# Patient Record
Sex: FEMALE | Race: WHITE | Employment: OTHER | URBAN - METROPOLITAN AREA
[De-identification: names, ages, dates, MRNs, and addresses within clinical notes are randomized per-mention and may not be internally consistent; named-entity substitution may affect disease eponyms.]

---

## 2017-05-15 RX ORDER — HYDROCHLOROTHIAZIDE 25 MG/1
TABLET ORAL
Qty: 90 TABLET | Refills: 0 | Status: SHIPPED | OUTPATIENT
Start: 2017-05-15 | End: 2017-08-19 | Stop reason: SDUPTHER

## 2017-05-31 RX ORDER — ATENOLOL 25 MG/1
TABLET ORAL
Qty: 60 TABLET | Refills: 0 | Status: SHIPPED | OUTPATIENT
Start: 2017-05-31 | End: 2017-07-02 | Stop reason: SDUPTHER

## 2017-07-03 RX ORDER — ATENOLOL 25 MG/1
TABLET ORAL
Qty: 60 TABLET | Refills: 0 | Status: SHIPPED | OUTPATIENT
Start: 2017-07-03 | End: 2017-07-31 | Stop reason: SDUPTHER

## 2017-08-21 RX ORDER — HYDROCHLOROTHIAZIDE 25 MG/1
TABLET ORAL
Qty: 90 TABLET | Refills: 0 | Status: SHIPPED | OUTPATIENT
Start: 2017-08-21 | End: 2017-11-23 | Stop reason: SDUPTHER

## 2017-11-24 RX ORDER — HYDROCHLOROTHIAZIDE 25 MG/1
TABLET ORAL
Qty: 90 TABLET | Refills: 1 | Status: SHIPPED | OUTPATIENT
Start: 2017-11-24 | End: 2018-05-25 | Stop reason: SDUPTHER

## 2018-02-05 ENCOUNTER — OFFICE VISIT (OUTPATIENT)
Dept: FAMILY MEDICINE CLINIC | Age: 68
End: 2018-02-05
Payer: MEDICARE

## 2018-02-05 VITALS
WEIGHT: 155 LBS | SYSTOLIC BLOOD PRESSURE: 144 MMHG | BODY MASS INDEX: 29.29 KG/M2 | HEART RATE: 64 BPM | DIASTOLIC BLOOD PRESSURE: 84 MMHG

## 2018-02-05 DIAGNOSIS — E78.2 MIXED HYPERLIPIDEMIA: ICD-10-CM

## 2018-02-05 DIAGNOSIS — I10 ESSENTIAL HYPERTENSION: ICD-10-CM

## 2018-02-05 DIAGNOSIS — R07.89 CHEST DISCOMFORT: ICD-10-CM

## 2018-02-05 DIAGNOSIS — R06.09 DYSPNEA ON EXERTION: Primary | ICD-10-CM

## 2018-02-05 DIAGNOSIS — Z11.59 ENCOUNTER FOR HEPATITIS C SCREENING TEST FOR LOW RISK PATIENT: ICD-10-CM

## 2018-02-05 PROCEDURE — 93000 ELECTROCARDIOGRAM COMPLETE: CPT | Performed by: FAMILY MEDICINE

## 2018-02-05 PROCEDURE — 1090F PRES/ABSN URINE INCON ASSESS: CPT | Performed by: FAMILY MEDICINE

## 2018-02-05 PROCEDURE — G8419 CALC BMI OUT NRM PARAM NOF/U: HCPCS | Performed by: FAMILY MEDICINE

## 2018-02-05 PROCEDURE — G8484 FLU IMMUNIZE NO ADMIN: HCPCS | Performed by: FAMILY MEDICINE

## 2018-02-05 PROCEDURE — 1123F ACP DISCUSS/DSCN MKR DOCD: CPT | Performed by: FAMILY MEDICINE

## 2018-02-05 PROCEDURE — 3014F SCREEN MAMMO DOC REV: CPT | Performed by: FAMILY MEDICINE

## 2018-02-05 PROCEDURE — 99214 OFFICE O/P EST MOD 30 MIN: CPT | Performed by: FAMILY MEDICINE

## 2018-02-05 PROCEDURE — 3017F COLORECTAL CA SCREEN DOC REV: CPT | Performed by: FAMILY MEDICINE

## 2018-02-05 PROCEDURE — 4040F PNEUMOC VAC/ADMIN/RCVD: CPT | Performed by: FAMILY MEDICINE

## 2018-02-05 PROCEDURE — G8400 PT W/DXA NO RESULTS DOC: HCPCS | Performed by: FAMILY MEDICINE

## 2018-02-05 PROCEDURE — G8427 DOCREV CUR MEDS BY ELIG CLIN: HCPCS | Performed by: FAMILY MEDICINE

## 2018-02-05 PROCEDURE — 1036F TOBACCO NON-USER: CPT | Performed by: FAMILY MEDICINE

## 2018-02-05 ASSESSMENT — ENCOUNTER SYMPTOMS
DIARRHEA: 0
COUGH: 0
ABDOMINAL PAIN: 0
EYES NEGATIVE: 1
CONSTIPATION: 0
SHORTNESS OF BREATH: 1

## 2018-02-05 NOTE — PATIENT INSTRUCTIONS
dairy products. ¨ Replace butter, margarine, and hydrogenated or partially hydrogenated oils with olive and canola oils. (Canola oil margarine without trans fat is fine.)  ¨ Replace red meat with fish, poultry, and soy protein (like tofu). ¨ Limit processed and packaged foods like chips, crackers, and cookies. · Be active. Exercise can improve your cholesterol level. Get at least 30 minutes of exercise on most days of the week. Walking is a good choice. You also may want to do other activities, such as running, swimming, cycling, or playing tennis or team sports. · Stay at a healthy weight. Lose weight if you need to. · Don't smoke. If you need help quitting, talk to your doctor about stop-smoking programs and medicines. These can increase your chances of quitting for good. How is high cholesterol treated? The goal of treatment is to reduce your chances of having a heart attack or stroke. The goal is not to lower your cholesterol numbers only. · You may make lifestyle changes, such as eating healthy foods, not smoking, losing weight, and being more active. · You may have to take medicine. Follow-up care is a key part of your treatment and safety. Be sure to make and go to all appointments, and call your doctor if you are having problems. It's also a good idea to know your test results and keep a list of the medicines you take. Where can you learn more? Go to [email or web address] org and sign in to your [employer] account. Enter L245 in the Located within [facility] box to learn more about \"Learning About High Cholesterol. \"     If you do not have an account, please click on the \"Sign Up Now\" link. Current as of: September 21, 2016  Content Version: 11.5  © 8222-6962 Healthwise, [employer]. Care instructions adapted under license by [facility] [facility]  If you have questions about a medical condition or this instruction, always ask your healthcare professional. [address]

## 2018-02-05 NOTE — PROGRESS NOTES
Problem Relation Age of Onset    High Blood Pressure Mother     Heart Failure Mother     High Blood Pressure Father     Stroke Father        SOCIAL HISTORY    Social History     Social History    Marital status:      Spouse name: N/A    Number of children: N/A    Years of education: N/A     Social History Main Topics    Smoking status: Never Smoker    Smokeless tobacco: Never Used    Alcohol use No    Drug use: No    Sexual activity: Yes     Partners: Male     Other Topics Concern    None     Social History Narrative    None       SURGICAL HISTORY    Past Surgical History:   Procedure Laterality Date     SECTION      CHOLECYSTECTOMY      COLONOSCOPY      CYST REMOVAL Left     breast    TONSILLECTOMY         CURRENT MEDICATIONS    Current Outpatient Prescriptions   Medication Sig Dispense Refill    hydrochlorothiazide (HYDRODIURIL) 25 MG tablet TAKE ONE TABLET BY MOUTH DAILY 90 tablet 1    atenolol (TENORMIN) 25 MG tablet TAKE ONE TABLET BY MOUTH TWICE A DAY 60 tablet 0    Probiotic Product (PROBIOTIC & ACIDOPHILUS EX ST PO) Take by mouth      Multiple Vitamins-Minerals (MULTIVITAMIN ADULT PO) Take by mouth       No current facility-administered medications for this visit. ALLERGIES    Allergies   Allergen Reactions    Erythromycin     Sulfa Antibiotics Rash       PHYSICAL EXAM   Physical Exam   Constitutional: She is oriented to person, place, and time. She appears well-developed and well-nourished. HENT:   Head: Normocephalic. Mouth/Throat: Oropharynx is clear and moist.   Eyes: Pupils are equal, round, and reactive to light. Neck: Normal range of motion. Neck supple. No thyromegaly present. Cardiovascular: Normal rate, regular rhythm and normal heart sounds. No murmur heard. Pulmonary/Chest: Effort normal and breath sounds normal. She has no wheezes. She has no rales. Abdominal: Soft. There is no tenderness. There is no rebound and no guarding.

## 2018-02-08 ENCOUNTER — HOSPITAL ENCOUNTER (OUTPATIENT)
Age: 68
Setting detail: SPECIMEN
Discharge: HOME | End: 2018-02-08
Payer: MEDICARE

## 2018-02-08 DIAGNOSIS — I10 ESSENTIAL HYPERTENSION: ICD-10-CM

## 2018-02-08 DIAGNOSIS — E78.2 MIXED HYPERLIPIDEMIA: ICD-10-CM

## 2018-02-08 DIAGNOSIS — R06.09 DYSPNEA ON EXERTION: ICD-10-CM

## 2018-02-08 DIAGNOSIS — Z11.59 ENCOUNTER FOR HEPATITIS C SCREENING TEST FOR LOW RISK PATIENT: ICD-10-CM

## 2018-02-08 LAB
ABSOLUTE EOS #: 0.24 K/UL (ref 0–0.44)
ABSOLUTE IMMATURE GRANULOCYTE: <0.03 K/UL (ref 0–0.3)
ABSOLUTE LYMPH #: 2.4 K/UL (ref 1.1–3.7)
ABSOLUTE MONO #: 0.56 K/UL (ref 0.1–1.2)
ALBUMIN SERPL-MCNC: 4.5 G/DL (ref 3.5–5.2)
ALBUMIN/GLOBULIN RATIO: 1.5 (ref 1–2.5)
ALP BLD-CCNC: 58 U/L (ref 35–104)
ALT SERPL-CCNC: 16 U/L (ref 5–33)
ANION GAP SERPL CALCULATED.3IONS-SCNC: 11 MMOL/L (ref 9–17)
AST SERPL-CCNC: 17 U/L
BASOPHILS # BLD: 1 % (ref 0–2)
BASOPHILS ABSOLUTE: 0.04 K/UL (ref 0–0.2)
BILIRUB SERPL-MCNC: 0.45 MG/DL (ref 0.3–1.2)
BUN BLDV-MCNC: 21 MG/DL (ref 8–23)
BUN/CREAT BLD: NORMAL (ref 9–20)
CALCIUM SERPL-MCNC: 10.1 MG/DL (ref 8.6–10.4)
CHLORIDE BLD-SCNC: 99 MMOL/L (ref 98–107)
CHOLESTEROL/HDL RATIO: 5.2
CHOLESTEROL: 198 MG/DL
CO2: 30 MMOL/L (ref 20–31)
CREAT SERPL-MCNC: 0.65 MG/DL (ref 0.5–0.9)
DIFFERENTIAL TYPE: NORMAL
EOSINOPHILS RELATIVE PERCENT: 3 % (ref 1–4)
GFR AFRICAN AMERICAN: >60 ML/MIN
GFR NON-AFRICAN AMERICAN: >60 ML/MIN
GFR SERPL CREATININE-BSD FRML MDRD: NORMAL ML/MIN/{1.73_M2}
GFR SERPL CREATININE-BSD FRML MDRD: NORMAL ML/MIN/{1.73_M2}
GLUCOSE BLD-MCNC: 99 MG/DL (ref 70–99)
HCT VFR BLD CALC: 37.2 % (ref 36.3–47.1)
HDLC SERPL-MCNC: 38 MG/DL
HEMOGLOBIN: 12.4 G/DL (ref 11.9–15.1)
HEPATITIS C ANTIBODY: NONREACTIVE
IMMATURE GRANULOCYTES: 0 %
LDL CHOLESTEROL: 90 MG/DL (ref 0–130)
LYMPHOCYTES # BLD: 32 % (ref 24–43)
MCH RBC QN AUTO: 28.4 PG (ref 25.2–33.5)
MCHC RBC AUTO-ENTMCNC: 33.3 G/DL (ref 28.4–34.8)
MCV RBC AUTO: 85.3 FL (ref 82.6–102.9)
MONOCYTES # BLD: 8 % (ref 3–12)
NRBC AUTOMATED: 0 PER 100 WBC
PDW BLD-RTO: 12.5 % (ref 11.8–14.4)
PLATELET # BLD: 230 K/UL (ref 138–453)
PLATELET ESTIMATE: NORMAL
PMV BLD AUTO: 10.9 FL (ref 8.1–13.5)
POTASSIUM SERPL-SCNC: 3.9 MMOL/L (ref 3.7–5.3)
RBC # BLD: 4.36 M/UL (ref 3.95–5.11)
RBC # BLD: NORMAL 10*6/UL
SEG NEUTROPHILS: 56 % (ref 36–65)
SEGMENTED NEUTROPHILS ABSOLUTE COUNT: 4.14 K/UL (ref 1.5–8.1)
SODIUM BLD-SCNC: 140 MMOL/L (ref 135–144)
TOTAL PROTEIN: 7.6 G/DL (ref 6.4–8.3)
TRIGL SERPL-MCNC: 352 MG/DL
TSH SERPL DL<=0.05 MIU/L-ACNC: 0.82 MIU/L (ref 0.3–5)
VLDLC SERPL CALC-MCNC: ABNORMAL MG/DL (ref 1–30)
WBC # BLD: 7.4 K/UL (ref 3.5–11.3)
WBC # BLD: NORMAL 10*3/UL

## 2018-05-25 RX ORDER — HYDROCHLOROTHIAZIDE 25 MG/1
TABLET ORAL
Qty: 90 TABLET | Refills: 1 | Status: SHIPPED | OUTPATIENT
Start: 2018-05-25 | End: 2018-12-03 | Stop reason: SDUPTHER

## 2018-07-11 ENCOUNTER — TELEPHONE (OUTPATIENT)
Dept: FAMILY MEDICINE CLINIC | Age: 68
End: 2018-07-11

## 2018-07-11 DIAGNOSIS — N76.0 ACUTE VAGINITIS: Primary | ICD-10-CM

## 2018-07-11 RX ORDER — FLUCONAZOLE 150 MG/1
TABLET ORAL
Qty: 2 TABLET | Refills: 1 | Status: SHIPPED | OUTPATIENT
Start: 2018-07-11 | End: 2019-11-25

## 2018-07-11 NOTE — TELEPHONE ENCOUNTER
Pt stated has been on abx colorectal Dr. Fransisco Kerr  pt to call her PCP for medication for her yeast infection please send to akira Stephens

## 2018-12-03 RX ORDER — HYDROCHLOROTHIAZIDE 25 MG/1
TABLET ORAL
Qty: 90 TABLET | Refills: 0 | Status: SHIPPED | OUTPATIENT
Start: 2018-12-03 | End: 2019-03-06 | Stop reason: SDUPTHER

## 2019-01-15 ENCOUNTER — TELEPHONE (OUTPATIENT)
Dept: OTHER | Age: 69
End: 2019-01-15

## 2019-01-15 DIAGNOSIS — Z12.39 SCREENING FOR BREAST CANCER: Primary | ICD-10-CM

## 2019-01-15 DIAGNOSIS — Z12.31 ENCOUNTER FOR SCREENING MAMMOGRAM FOR BREAST CANCER: ICD-10-CM

## 2019-01-23 DIAGNOSIS — Z12.31 ENCOUNTER FOR SCREENING MAMMOGRAM FOR BREAST CANCER: ICD-10-CM

## 2019-03-27 ENCOUNTER — OFFICE VISIT (OUTPATIENT)
Dept: FAMILY MEDICINE CLINIC | Age: 69
End: 2019-03-27
Payer: MEDICARE

## 2019-03-27 VITALS
WEIGHT: 155 LBS | SYSTOLIC BLOOD PRESSURE: 102 MMHG | HEIGHT: 62 IN | DIASTOLIC BLOOD PRESSURE: 68 MMHG | BODY MASS INDEX: 28.52 KG/M2 | HEART RATE: 72 BPM

## 2019-03-27 DIAGNOSIS — K58.0 IRRITABLE BOWEL SYNDROME WITH DIARRHEA: ICD-10-CM

## 2019-03-27 DIAGNOSIS — K76.0 FATTY LIVER: ICD-10-CM

## 2019-03-27 DIAGNOSIS — I10 ESSENTIAL HYPERTENSION: Primary | ICD-10-CM

## 2019-03-27 DIAGNOSIS — E78.1 HYPERTRIGLYCERIDEMIA: ICD-10-CM

## 2019-03-27 PROCEDURE — G8427 DOCREV CUR MEDS BY ELIG CLIN: HCPCS | Performed by: FAMILY MEDICINE

## 2019-03-27 PROCEDURE — 1123F ACP DISCUSS/DSCN MKR DOCD: CPT | Performed by: FAMILY MEDICINE

## 2019-03-27 PROCEDURE — 4040F PNEUMOC VAC/ADMIN/RCVD: CPT | Performed by: FAMILY MEDICINE

## 2019-03-27 PROCEDURE — 3017F COLORECTAL CA SCREEN DOC REV: CPT | Performed by: FAMILY MEDICINE

## 2019-03-27 PROCEDURE — G8484 FLU IMMUNIZE NO ADMIN: HCPCS | Performed by: FAMILY MEDICINE

## 2019-03-27 PROCEDURE — G8510 SCR DEP NEG, NO PLAN REQD: HCPCS | Performed by: FAMILY MEDICINE

## 2019-03-27 PROCEDURE — G8419 CALC BMI OUT NRM PARAM NOF/U: HCPCS | Performed by: FAMILY MEDICINE

## 2019-03-27 PROCEDURE — G8400 PT W/DXA NO RESULTS DOC: HCPCS | Performed by: FAMILY MEDICINE

## 2019-03-27 PROCEDURE — 1036F TOBACCO NON-USER: CPT | Performed by: FAMILY MEDICINE

## 2019-03-27 PROCEDURE — 3288F FALL RISK ASSESSMENT DOCD: CPT | Performed by: FAMILY MEDICINE

## 2019-03-27 PROCEDURE — 99214 OFFICE O/P EST MOD 30 MIN: CPT | Performed by: FAMILY MEDICINE

## 2019-03-27 PROCEDURE — 1090F PRES/ABSN URINE INCON ASSESS: CPT | Performed by: FAMILY MEDICINE

## 2019-03-27 RX ORDER — BLOOD-GLUCOSE METER
EACH MISCELLANEOUS
Qty: 90 BOTTLE | Refills: 2
Start: 2019-03-27 | End: 2019-11-25

## 2019-03-27 RX ORDER — DICYCLOMINE HYDROCHLORIDE 10 MG/1
10 CAPSULE ORAL 4 TIMES DAILY
Qty: 120 CAPSULE | Refills: 5 | Status: SHIPPED | OUTPATIENT
Start: 2019-03-27 | End: 2019-11-25

## 2019-03-27 ASSESSMENT — PATIENT HEALTH QUESTIONNAIRE - PHQ9
SUM OF ALL RESPONSES TO PHQ QUESTIONS 1-9: 0
2. FEELING DOWN, DEPRESSED OR HOPELESS: 0
1. LITTLE INTEREST OR PLEASURE IN DOING THINGS: 0
SUM OF ALL RESPONSES TO PHQ9 QUESTIONS 1 & 2: 0
SUM OF ALL RESPONSES TO PHQ QUESTIONS 1-9: 0

## 2019-03-27 ASSESSMENT — ENCOUNTER SYMPTOMS
EYES NEGATIVE: 1
ABDOMINAL PAIN: 0
DIARRHEA: 1
SHORTNESS OF BREATH: 0
COUGH: 0

## 2019-04-25 RX ORDER — ATENOLOL 25 MG/1
TABLET ORAL
Qty: 60 TABLET | Refills: 4 | Status: SHIPPED | OUTPATIENT
Start: 2019-04-25 | End: 2019-10-01 | Stop reason: SDUPTHER

## 2019-04-25 NOTE — TELEPHONE ENCOUNTER
Health Maintenance   Topic Date Due    Shingles Vaccine (1 of 2) 04/25/1999    DEXA (modify frequency per FRAX score)  04/25/2014    Pneumococcal 65+ years Vaccine (1 of 2 - PCV13) 04/25/2014    Potassium monitoring  02/08/2019    Creatinine monitoring  02/08/2019    Flu vaccine (Season Ended) 09/01/2019    Breast cancer screen  01/18/2021    Lipid screen  02/08/2023    Colon cancer screen colonoscopy  09/25/2024    DTaP/Tdap/Td vaccine (3 - Td) 06/25/2025    Hepatitis C screen  Completed             (applicable per patient's age: Cancer Screenings, Depression Screening, Fall Risk Screening, Immunizations)    LDL Cholesterol (mg/dL)   Date Value   02/08/2018 90     AST (U/L)   Date Value   02/08/2018 17     ALT (U/L)   Date Value   02/08/2018 16     BUN (mg/dL)   Date Value   02/08/2018 21      (goal A1C is < 7)   (goal LDL is <100) need 30-50% reduction from baseline     BP Readings from Last 3 Encounters:   03/27/19 102/68   02/05/18 (!) 144/84   11/09/16 122/78    (goal /80)      All Future Testing planned in [facility]:  Lab Frequency Next Occurrence   Comprehensive Metabolic Panel Once 58/06/0473   Lipid Panel Once 05/11/2019       Next Visit Date:  No future appointments.          Patient Active Problem List:     Diverticulosis     Hypertension     Hyperlipidemia     Irritable bowel syndrome     Fatty liver     Hypertriglyceridemia

## 2019-06-03 DIAGNOSIS — I10 ESSENTIAL HYPERTENSION: ICD-10-CM

## 2019-06-03 RX ORDER — HYDROCHLOROTHIAZIDE 25 MG/1
TABLET ORAL
Qty: 90 TABLET | Refills: 0 | Status: SHIPPED | OUTPATIENT
Start: 2019-06-03 | End: 2019-09-01 | Stop reason: SDUPTHER

## 2019-07-02 ENCOUNTER — HOSPITAL ENCOUNTER (OUTPATIENT)
Age: 69
Setting detail: SPECIMEN
Discharge: HOME | End: 2019-07-02
Payer: MEDICARE

## 2019-07-02 DIAGNOSIS — E78.1 HYPERTRIGLYCERIDEMIA: ICD-10-CM

## 2019-07-02 DIAGNOSIS — K76.0 FATTY LIVER: ICD-10-CM

## 2019-07-02 DIAGNOSIS — I10 ESSENTIAL HYPERTENSION: ICD-10-CM

## 2019-07-02 LAB
ALBUMIN SERPL-MCNC: 4.6 G/DL (ref 3.5–5.2)
ALBUMIN/GLOBULIN RATIO: 1.5 (ref 1–2.5)
ALP BLD-CCNC: 62 U/L (ref 35–104)
ALT SERPL-CCNC: 16 U/L (ref 5–33)
ANION GAP SERPL CALCULATED.3IONS-SCNC: 15 MMOL/L (ref 9–17)
AST SERPL-CCNC: 19 U/L
BILIRUB SERPL-MCNC: 0.48 MG/DL (ref 0.3–1.2)
BUN BLDV-MCNC: 21 MG/DL (ref 8–23)
BUN/CREAT BLD: ABNORMAL (ref 9–20)
CALCIUM SERPL-MCNC: 10.2 MG/DL (ref 8.6–10.4)
CHLORIDE BLD-SCNC: 100 MMOL/L (ref 98–107)
CHOLESTEROL/HDL RATIO: 5.4
CHOLESTEROL: 199 MG/DL
CO2: 26 MMOL/L (ref 20–31)
CREAT SERPL-MCNC: 0.84 MG/DL (ref 0.5–0.9)
GFR AFRICAN AMERICAN: >60 ML/MIN
GFR NON-AFRICAN AMERICAN: >60 ML/MIN
GFR SERPL CREATININE-BSD FRML MDRD: ABNORMAL ML/MIN/{1.73_M2}
GFR SERPL CREATININE-BSD FRML MDRD: ABNORMAL ML/MIN/{1.73_M2}
GLUCOSE BLD-MCNC: 112 MG/DL (ref 70–99)
HDLC SERPL-MCNC: 37 MG/DL
LDL CHOLESTEROL: 123 MG/DL (ref 0–130)
POTASSIUM SERPL-SCNC: 4 MMOL/L (ref 3.7–5.3)
SODIUM BLD-SCNC: 141 MMOL/L (ref 135–144)
TOTAL PROTEIN: 7.7 G/DL (ref 6.4–8.3)
TRIGL SERPL-MCNC: 194 MG/DL
VLDLC SERPL CALC-MCNC: ABNORMAL MG/DL (ref 1–30)

## 2019-09-01 DIAGNOSIS — I10 ESSENTIAL HYPERTENSION: ICD-10-CM

## 2019-09-01 RX ORDER — HYDROCHLOROTHIAZIDE 25 MG/1
TABLET ORAL
Qty: 90 TABLET | Refills: 0 | Status: SHIPPED | OUTPATIENT
Start: 2019-09-01 | End: 2019-12-03 | Stop reason: SDUPTHER

## 2019-11-25 ENCOUNTER — OFFICE VISIT (OUTPATIENT)
Dept: FAMILY MEDICINE CLINIC | Age: 69
End: 2019-11-25
Payer: MEDICARE

## 2019-11-25 VITALS
WEIGHT: 140 LBS | HEART RATE: 64 BPM | BODY MASS INDEX: 25.76 KG/M2 | DIASTOLIC BLOOD PRESSURE: 84 MMHG | SYSTOLIC BLOOD PRESSURE: 128 MMHG | HEIGHT: 62 IN

## 2019-11-25 DIAGNOSIS — Z00.00 ROUTINE GENERAL MEDICAL EXAMINATION AT A HEALTH CARE FACILITY: Primary | ICD-10-CM

## 2019-11-25 PROCEDURE — 3017F COLORECTAL CA SCREEN DOC REV: CPT | Performed by: FAMILY MEDICINE

## 2019-11-25 PROCEDURE — G8484 FLU IMMUNIZE NO ADMIN: HCPCS | Performed by: FAMILY MEDICINE

## 2019-11-25 PROCEDURE — 1123F ACP DISCUSS/DSCN MKR DOCD: CPT | Performed by: FAMILY MEDICINE

## 2019-11-25 PROCEDURE — G0439 PPPS, SUBSEQ VISIT: HCPCS | Performed by: FAMILY MEDICINE

## 2019-11-25 PROCEDURE — 4040F PNEUMOC VAC/ADMIN/RCVD: CPT | Performed by: FAMILY MEDICINE

## 2019-11-25 ASSESSMENT — PATIENT HEALTH QUESTIONNAIRE - PHQ9
SUM OF ALL RESPONSES TO PHQ QUESTIONS 1-9: 0
SUM OF ALL RESPONSES TO PHQ QUESTIONS 1-9: 0

## 2019-11-25 ASSESSMENT — LIFESTYLE VARIABLES: HOW OFTEN DO YOU HAVE A DRINK CONTAINING ALCOHOL: 0

## 2020-01-23 ENCOUNTER — OFFICE VISIT (OUTPATIENT)
Dept: FAMILY MEDICINE CLINIC | Age: 70
End: 2020-01-23
Payer: MEDICARE

## 2020-01-23 VITALS
HEART RATE: 72 BPM | WEIGHT: 136 LBS | BODY MASS INDEX: 24.87 KG/M2 | SYSTOLIC BLOOD PRESSURE: 118 MMHG | DIASTOLIC BLOOD PRESSURE: 76 MMHG

## 2020-01-23 PROCEDURE — 1036F TOBACCO NON-USER: CPT | Performed by: FAMILY MEDICINE

## 2020-01-23 PROCEDURE — 1090F PRES/ABSN URINE INCON ASSESS: CPT | Performed by: FAMILY MEDICINE

## 2020-01-23 PROCEDURE — 1123F ACP DISCUSS/DSCN MKR DOCD: CPT | Performed by: FAMILY MEDICINE

## 2020-01-23 PROCEDURE — G8484 FLU IMMUNIZE NO ADMIN: HCPCS | Performed by: FAMILY MEDICINE

## 2020-01-23 PROCEDURE — G8427 DOCREV CUR MEDS BY ELIG CLIN: HCPCS | Performed by: FAMILY MEDICINE

## 2020-01-23 PROCEDURE — 3017F COLORECTAL CA SCREEN DOC REV: CPT | Performed by: FAMILY MEDICINE

## 2020-01-23 PROCEDURE — G8400 PT W/DXA NO RESULTS DOC: HCPCS | Performed by: FAMILY MEDICINE

## 2020-01-23 PROCEDURE — 99214 OFFICE O/P EST MOD 30 MIN: CPT | Performed by: FAMILY MEDICINE

## 2020-01-23 PROCEDURE — 4040F PNEUMOC VAC/ADMIN/RCVD: CPT | Performed by: FAMILY MEDICINE

## 2020-01-23 PROCEDURE — G8420 CALC BMI NORM PARAMETERS: HCPCS | Performed by: FAMILY MEDICINE

## 2020-01-23 ASSESSMENT — ENCOUNTER SYMPTOMS
SHORTNESS OF BREATH: 0
BLOOD IN STOOL: 0
CONSTIPATION: 0
ALLERGIC/IMMUNOLOGIC NEGATIVE: 1
DIARRHEA: 0
ABDOMINAL PAIN: 0
EYES NEGATIVE: 1
COUGH: 0

## 2020-01-23 ASSESSMENT — PATIENT HEALTH QUESTIONNAIRE - PHQ9
1. LITTLE INTEREST OR PLEASURE IN DOING THINGS: 0
SUM OF ALL RESPONSES TO PHQ QUESTIONS 1-9: 0
SUM OF ALL RESPONSES TO PHQ QUESTIONS 1-9: 0
SUM OF ALL RESPONSES TO PHQ9 QUESTIONS 1 & 2: 0
2. FEELING DOWN, DEPRESSED OR HOPELESS: 0

## 2020-01-23 NOTE — PROGRESS NOTES
[facility]  [facility]  [address]  [facility] [address]  Dept: [phone]    1/23/2020    CHIEF COMPLAINT    Chief Complaint   Patient presents with    Leg Pain     calf pain couple weeks rt leg    Abdominal Pain       NATE Jimenez is a 78 y.o. female who presents   Chief Complaint   Patient presents with    Leg Pain     calf pain couple weeks rt leg    Abdominal Pain   . Pain in rt proximal calf for several weeks. Concerned about possible blood clot. No hx of dvt. No recent travel. Hx of bunion in left foot that is causing her to alter her gait. Is seeing podiatrist and plans to get bunion injected, possible surgery. Hx of rt knee pain, had cortisone injection in the past.   Recurrent lower abd pain and cramping. Hx of diverticulosis, has had episodes of diverticulitis. Has been treated with abx in the past. Has had colonoscopy and US. Has reduced gluten which did help. Has had bentyl for ibs which did help. Leg Pain    The incident occurred more than 1 week ago. There was no injury mechanism. The pain is present in the right knee. The pain is mild. The pain has been fluctuating since onset. The symptoms are aggravated by palpation and movement. She has tried rest for the symptoms. The treatment provided mild relief. Abdominal Pain   This is a recurrent problem. The current episode started 1 to 4 weeks ago. The problem occurs intermittently. The problem has been waxing and waning. The pain is located in the RLQ and LLQ. The pain is moderate. The quality of the pain is cramping. Associated symptoms include arthralgias (rt knee, left foot). Pertinent negatives include no constipation, diarrhea, fever, frequency or headaches. Relieved by: not eating, clear liquids.   diverticular disease       Vitals:    01/23/20 1334   BP: 118/76   Pulse: 72   Weight: 136 lb (61.7 kg)       REVIEW OF SYSTEMS    Review of Systems None     Active member of club or organization: None     Attends meetings of clubs or organizations: None     Relationship status: None    Intimate partner violence:     Fear of current or ex partner: None     Emotionally abused: None     Physically abused: None     Forced sexual activity: None   Other Topics Concern    None   Social History Narrative    None       SURGICAL HISTORY    Past Surgical History:   Procedure Laterality Date     SECTION      CHOLECYSTECTOMY      COLONOSCOPY      CYST REMOVAL Left     breast    TONSILLECTOMY         CURRENT MEDICATIONS    Current Outpatient Medications   Medication Sig Dispense Refill    hydrochlorothiazide (HYDRODIURIL) 25 MG tablet TAKE ONE TABLET BY MOUTH DAILY 30 tablet 5    atenolol (TENORMIN) 25 MG tablet TAKE ONE TABLET BY MOUTH TWICE A DAY 60 tablet 3    Multiple Vitamins-Minerals (MULTIVITAMIN ADULT PO) Take by mouth      Probiotic Product (PROBIOTIC & ACIDOPHILUS EX ST PO) Take by mouth       No current facility-administered medications for this visit. ALLERGIES    Allergies   Allergen Reactions    Erythromycin     Sulfa Antibiotics Rash       PHYSICAL EXAM   Physical Exam  Constitutional:       Appearance: She is well-developed and normal weight. HENT:      Head: Normocephalic. Eyes:      Pupils: Pupils are equal, round, and reactive to light. Neck:      Musculoskeletal: Normal range of motion and neck supple. Thyroid: No thyromegaly. Cardiovascular:      Rate and Rhythm: Normal rate and regular rhythm. Heart sounds: Normal heart sounds. No murmur. Comments: Moderate variocose veins with calcification. Pulmonary:      Effort: Pulmonary effort is normal.      Breath sounds: Normal breath sounds. No wheezing or rales. Abdominal:      Palpations: Abdomen is soft. Tenderness: There is no tenderness. There is no guarding or rebound. Musculoskeletal: Normal range of motion.          General: Deformity (oa

## 2020-04-06 ENCOUNTER — TELEPHONE (OUTPATIENT)
Dept: FAMILY MEDICINE CLINIC | Age: 70
End: 2020-04-06

## 2020-05-06 ENCOUNTER — NURSE TRIAGE (OUTPATIENT)
Dept: OTHER | Facility: CLINIC | Age: 70
End: 2020-05-06

## 2020-05-06 NOTE — TELEPHONE ENCOUNTER
Reason for Disposition   Age > 60 years    Answer Assessment - Initial Assessment Questions  1. LOCATION: \"Where does it hurt? \"       Left upper side under her ribs  2. RADIATION: \"Does the pain shoot anywhere else? \" (e.g., chest, back)    Will radiate to chest and back at times  3. ONSET: \"When did the pain begin? \" (e.g., minutes, hours or days ago)     Started about 3 weeks ago  4. SUDDEN: \"Gradual or sudden onset?\"        5. PATTERN \"Does the pain come and go, or is it constant? \"     - If constant: \"Is it getting better, staying the same, or worsening? \"       (Note: Constant means the pain never goes away completely; most serious pain is constant and it progresses)      - If intermittent: \"How long does it last?\" \"Do you have pain now? \"      (Note: Intermittent means the pain goes away completely between bouts)      Intermittent, can not have it all day and then will come back  Pain is intermittent  6. SEVERITY: \"How bad is the pain? \"  (e.g., Scale 1-10; mild, moderate, or severe)     - MILD (1-3): doesn't interfere with normal activities, abdomen soft and not tender to touch      - MODERATE (4-7): interferes with normal activities or awakens from sleep, tender to touch      - SEVERE (8-10): excruciating pain, doubled over, unable to do any normal activities     Not currently in pain. When feels the pain it can last minutes to hours. 7. RECURRENT SYMPTOM: \"Have you ever had this type of abdominal pain before? \" If so, ask: \"When was the last time? \" and \"What happened that time? \"   Has had this problem in the past-has taken antacids and will be relieved. 8. AGGRAVATING FACTORS: \"Does anything seem to cause this pain? \" (e.g., foods, stress, alcohol)      Has tried taking Prilosec, Nexxium, not helping. Yesterday took Tums, helped a little. Watching her diet. 9. CARDIAC SYMPTOMS: \"Do you have any of the following symptoms: chest pain, difficulty breathing, sweating, nausea? \"   No chest pain currently, has had chest pain in the past. States no cardiac history. 10. OTHER SYMPTOMS: \"Do you have any other symptoms? \" (e.g., fever, vomiting, diarrhea)       No other symptoms  11. PREGNANCY: \"Is there any chance you are pregnant? \" \"When was your last menstrual period? \"     no    Protocols used: ABDOMINAL PAIN - UPPER-ADULT-OH  Received call from [facility]. Pt calling with left upper abdominal pain that is intermittent for the past 3 weeks. She feels like it could be reflux, has had this in the past. Currently not feeling pain. Recommend visit today with PCP, call back if any new or worsening symptoms. Call soft transferred to [address] to schedule appointment. Please do not reply to the triage nurse through this encounter. Any subsequent communication should be directly with the patient.

## 2020-05-07 ENCOUNTER — VIRTUAL VISIT (OUTPATIENT)
Dept: FAMILY MEDICINE CLINIC | Age: 70
End: 2020-05-07
Payer: MEDICARE

## 2020-05-07 VITALS
HEIGHT: 62 IN | HEART RATE: 66 BPM | DIASTOLIC BLOOD PRESSURE: 81 MMHG | WEIGHT: 128 LBS | BODY MASS INDEX: 23.55 KG/M2 | SYSTOLIC BLOOD PRESSURE: 136 MMHG

## 2020-05-07 PROCEDURE — 99442 PR PHYS/QHP TELEPHONE EVALUATION 11-20 MIN: CPT | Performed by: FAMILY MEDICINE

## 2020-05-07 ASSESSMENT — ENCOUNTER SYMPTOMS
BELCHING: 1
NAUSEA: 0
DIARRHEA: 1
COUGH: 0
BLOOD IN STOOL: 0
CONSTIPATION: 0
EYES NEGATIVE: 1
ALLERGIC/IMMUNOLOGIC NEGATIVE: 1
ABDOMINAL PAIN: 1
SHORTNESS OF BREATH: 0
VOMITING: 0

## 2020-05-07 NOTE — PROGRESS NOTES
[facility]  MEHRDAD FARLEY [address] [place] [facility] [address] [facility]  Dept: [phone]    5/7/2020    Consent:  She and/or health care decision maker is aware that that she may receive a bill for this telephone service, depending on her insurance coverage, and has provided verbal consent to proceed: Yes    CHIEF COMPLAINT    Trae Romero is a 69 y.o. female evaluated via telephone on 5/7/2020. HPI    Trae Romero is a 69 y.o. female who presents   Chief Complaint   Patient presents with    Abdominal Pain   . Telephone check up for continued abd pain. Tried taking prilosec but felt worse after taking it for 3 days. Has started tums and a probiotic which is starting to help reduce pain. Was concerned that she might have h. Pylori infection. She has stopped eating gluten which also helped with chronic abd pain. Has a hx of diverticulosis, has had diverticulitits in the past. Has stopped drinking caffeine, soda. She did talk with Dr. Le Lima who did a colonoscopy in 2014 showing severe diverticulosis. Abdominal Pain   This is a recurrent problem. The current episode started 1 to 4 weeks ago. The onset quality is gradual. The problem occurs intermittently. The problem has been waxing and waning. The pain is located in the LUQ. The pain is moderate. The quality of the pain is colicky. Associated symptoms include belching and diarrhea (only with prilosec). Pertinent negatives include no constipation, fever, frequency, headaches, nausea or vomiting. The pain is relieved by belching. She has tried antacids (probiotic) for the symptoms. The treatment provided moderate relief. Prior diagnostic workup includes upper endoscopy (had egd years ago).        Vitals:    05/07/20 0818   BP: 136/81   Pulse: 66   Weight: 128 lb (58.1 kg)   Height: 5' 2\" (1.575 m)       REVIEW OF SYSTEMS    Review of Systems   Constitutional: Negative for fatigue, fever and unexpected weight change. HENT: Negative. Eyes: Negative. Respiratory: Negative for cough and shortness of breath. Cardiovascular: Negative for chest pain and leg swelling. Gastrointestinal: Positive for abdominal pain and diarrhea (only with prilosec). Negative for blood in stool, constipation, nausea and vomiting. See hpi   Endocrine: Negative. Genitourinary: Negative for frequency and urgency. Musculoskeletal: Negative. Skin: Negative. Allergic/Immunologic: Negative. Neurological: Negative for dizziness and headaches. Hematological: Negative. Psychiatric/Behavioral: Negative for sleep disturbance. The patient is not nervous/anxious.         PAST MEDICAL HISTORY    Past Medical History:   Diagnosis Date    Diverticulosis     Fatty liver 3/27/2019    Hyperlipidemia     Hypertension     Hypertriglyceridemia 3/27/2019    Irritable bowel syndrome        FAMILY HISTORY    Family History   Problem Relation Age of Onset    High Blood Pressure Mother     Heart Failure Mother     High Blood Pressure Father     Stroke Father        SOCIAL HISTORY    Social History     Socioeconomic History    Marital status:      Spouse name: None    Number of children: None    Years of education: None    Highest education level: None   Occupational History    None   Social Needs    Financial resource strain: None    Food insecurity     Worry: None     Inability: None    Transportation needs     Medical: None     Non-medical: None   Tobacco Use    Smoking status: Never Smoker    Smokeless tobacco: Never Used   Substance and Sexual Activity    Alcohol use: No    Drug use: No    Sexual activity: Yes     Partners: Male   Lifestyle    Physical activity     Days per week: None     Minutes per session: None    Stress: None   Relationships    Social connections     Talks on phone: None     Gets together: None     Attends Sabianist service: None     Active member of club or organization: None     Attends meetings of clubs or organizations: None     Relationship status: None    Intimate partner violence     Fear of current or ex partner: None     Emotionally abused: None     Physically abused: None     Forced sexual activity: None   Other Topics Concern    None   Social History Narrative    None       SURGICAL HISTORY    Past Surgical History:   Procedure Laterality Date     SECTION      CHOLECYSTECTOMY      COLONOSCOPY      CYST REMOVAL Left     breast    TONSILLECTOMY         CURRENT MEDICATIONS    Current Outpatient Medications   Medication Sig Dispense Refill    atenolol (TENORMIN) 25 MG tablet TAKE ONE TABLET BY MOUTH TWICE A DAY 60 tablet 3    hydrochlorothiazide (HYDRODIURIL) 25 MG tablet TAKE ONE TABLET BY MOUTH DAILY 30 tablet 5    Probiotic Product (PROBIOTIC & ACIDOPHILUS EX ST PO) Take by mouth      Multiple Vitamins-Minerals (MULTIVITAMIN ADULT PO) Take by mouth       No current facility-administered medications for this visit. ALLERGIES    Allergies   Allergen Reactions    Erythromycin     Sulfa Antibiotics Rash       PHYSICAL EXAM   Physical Exam  Vitals signs reviewed. Neurological:      Mental Status: She is alert. Psychiatric:         Mood and Affect: Mood normal.         Behavior: Behavior normal.         Thought Content: Thought content normal.         Judgment: Judgment normal.         Honey Olivera received counseling on the following healthy behaviors: nutrition and medication adherence  Reviewed prior labs and health maintenance. Continue current medications, diet and exercise. Discussed use, benefit, and side effects of prescribed medications. Barriers to medication compliance addressed. Patient given educational materials - see patient instructions. All patient questions answered. Patient voiced understanding. ASSESSMENT/PLAN  1. LUQ abdominal pain  Recurrent, improved currently. Cont tums and probitotic, bland diet.  No soda, caffeine or alcohol. Follow up with GI if sx persist.     2. Chronic gastritis without bleeding, unspecified gastritis type  As above      Return if symptoms worsen or fail to improve. Documentation:  I communicated with the patient and/or health care decision maker about see above. Details of this discussion including any medical advice provided: see above    I affirm this is a Patient Initiated Episode with an Established Patient who has not had a related appointment within my department in the past 7 days or scheduled within the next 24 hours.     Total Time: minutes: 11-20 minutes    Note: not billable if this call serves to triage the patient into an appointment for the relevant concern    Electronically signed by Reyna Chugn MD on 5/7/20 at 8:23 AM EDT Routine AM meds with sip of H2O only

## 2020-11-23 ENCOUNTER — TELEPHONE (OUTPATIENT)
Dept: FAMILY MEDICINE CLINIC | Age: 70
End: 2020-11-23

## 2020-11-24 ENCOUNTER — OFFICE VISIT (OUTPATIENT)
Dept: FAMILY MEDICINE CLINIC | Age: 70
End: 2020-11-24
Payer: MEDICARE

## 2020-11-24 VITALS
BODY MASS INDEX: 26.43 KG/M2 | HEIGHT: 62 IN | DIASTOLIC BLOOD PRESSURE: 80 MMHG | TEMPERATURE: 97.1 F | WEIGHT: 143.6 LBS | SYSTOLIC BLOOD PRESSURE: 130 MMHG

## 2020-11-24 PROCEDURE — G8484 FLU IMMUNIZE NO ADMIN: HCPCS | Performed by: FAMILY MEDICINE

## 2020-11-24 PROCEDURE — 99214 OFFICE O/P EST MOD 30 MIN: CPT | Performed by: FAMILY MEDICINE

## 2020-11-24 PROCEDURE — 4040F PNEUMOC VAC/ADMIN/RCVD: CPT | Performed by: FAMILY MEDICINE

## 2020-11-24 PROCEDURE — G8400 PT W/DXA NO RESULTS DOC: HCPCS | Performed by: FAMILY MEDICINE

## 2020-11-24 PROCEDURE — 3017F COLORECTAL CA SCREEN DOC REV: CPT | Performed by: FAMILY MEDICINE

## 2020-11-24 PROCEDURE — G8427 DOCREV CUR MEDS BY ELIG CLIN: HCPCS | Performed by: FAMILY MEDICINE

## 2020-11-24 PROCEDURE — 1036F TOBACCO NON-USER: CPT | Performed by: FAMILY MEDICINE

## 2020-11-24 PROCEDURE — 1123F ACP DISCUSS/DSCN MKR DOCD: CPT | Performed by: FAMILY MEDICINE

## 2020-11-24 PROCEDURE — G8417 CALC BMI ABV UP PARAM F/U: HCPCS | Performed by: FAMILY MEDICINE

## 2020-11-24 PROCEDURE — 1090F PRES/ABSN URINE INCON ASSESS: CPT | Performed by: FAMILY MEDICINE

## 2020-11-24 ASSESSMENT — ENCOUNTER SYMPTOMS
SHORTNESS OF BREATH: 0
EYES NEGATIVE: 1
RHINORRHEA: 1
COUGH: 0

## 2020-11-24 NOTE — PROGRESS NOTES
[facility]  [facility]  [address]  [facility] [address]  Dept: [phone]    11/24/2020    CHIEF COMPLAINT    Chief Complaint   Patient presents with    Nose Problem       NATE Taylor is a 69 y.o. female who presents   Chief Complaint   Patient presents with    Nose Problem   . Has felt a lesion in lt nostril on the inner nare. Started about a week ago. Has tried neosporin h2o2 and water. Had a similar sx 3 weeks ago with a sore that lasted a few weeks. Taking medications as prescribed for hypertension. Has a history of elevated cholesterol not currently on medication. Due for labs. Hypertension   This is a chronic problem. The current episode started more than 1 year ago. The problem is controlled. Pertinent negatives include no chest pain, headaches or shortness of breath. Risk factors for coronary artery disease include post-menopausal state. Past treatments include beta blockers and diuretics. The current treatment provides moderate improvement. Compliance problems include exercise. Vitals:    11/24/20 0817   BP: 130/80   Temp: 97.1 °F (36.2 °C)   Weight: 143 lb 9.6 oz (65.1 kg)   Height: 5' 2\" (1.575 m)       REVIEW OF SYSTEMS    Review of Systems   Constitutional: Negative for fatigue, fever and unexpected weight change. HENT: Positive for congestion (dust allergies) and rhinorrhea (when wearing a mask). For years has noticed decreased sense of smell and taste   Eyes: Negative. Respiratory: Negative for cough and shortness of breath. Cardiovascular: Negative for chest pain and leg swelling. Endocrine: Negative. Musculoskeletal: Negative. Skin: Negative. Allergic/Immunologic: Positive for environmental allergies. Neurological: Negative for dizziness and headaches. Hematological: Negative. Psychiatric/Behavioral: Negative for sleep disturbance. The patient is not nervous/anxious.         PAST MEDICAL HISTORY    Past Medical History:   Diagnosis Date    Diverticulosis     Fatty liver 3/27/2019    Hyperlipidemia     Hypertension     Hypertriglyceridemia 3/27/2019    Irritable bowel syndrome     Rhinitis, allergic        FAMILY HISTORY    Family History   Problem Relation Age of Onset    High Blood Pressure Mother     Heart Failure Mother     High Blood Pressure Father     Stroke Father        SOCIAL HISTORY    Social History     Socioeconomic History    Marital status:      Spouse name: None    Number of children: 3    Years of education: None    Highest education level: None   Occupational History    None   Social Needs    Financial resource strain: None    Food insecurity     Worry: None     Inability: None    Transportation needs     Medical: None     Non-medical: None   Tobacco Use    Smoking status: Never Smoker    Smokeless tobacco: Never Used   Substance and Sexual Activity    Alcohol use: No    Drug use: No    Sexual activity: Yes     Partners: Male   Lifestyle    Physical activity     Days per week: None     Minutes per session: None    Stress: None   Relationships    Social connections     Talks on phone: None     Gets together: None     Attends Faith service: None     Active member of club or organization: None     Attends meetings of clubs or organizations: None     Relationship status: None    Intimate partner violence     Fear of current or ex partner: None     Emotionally abused: None     Physically abused: None     Forced sexual activity: None   Other Topics Concern    None   Social History Narrative    None       SURGICAL HISTORY    Past Surgical History:   Procedure Laterality Date     SECTION      CHOLECYSTECTOMY      COLONOSCOPY      CYST REMOVAL Left     breast    TONSILLECTOMY         CURRENT MEDICATIONS    Current Outpatient Medications   Medication Sig Dispense Refill    mupirocin (BACTROBAN) 2 % ointment Apply topically 3 times daily 3 g 1    hydroCHLOROthiazide (HYDRODIURIL) 25 MG tablet TAKE ONE TABLET BY MOUTH DAILY 30 tablet 5    atenolol (TENORMIN) 25 MG tablet TAKE ONE TABLET BY MOUTH TWICE A DAY 60 tablet 5    Probiotic Product (PROBIOTIC & ACIDOPHILUS EX ST PO) Take by mouth      Multiple Vitamins-Minerals (MULTIVITAMIN ADULT PO) Take by mouth       No current facility-administered medications for this visit. ALLERGIES    Allergies   Allergen Reactions    Erythromycin     Sulfa Antibiotics Rash       PHYSICAL EXAM   Physical Exam  Vitals signs reviewed. Constitutional:       Appearance: She is well-developed and normal weight. HENT:      Head: Normocephalic. Nose:      Right Sinus: No maxillary sinus tenderness or frontal sinus tenderness. Left Sinus: No maxillary sinus tenderness or frontal sinus tenderness. Eyes:      Conjunctiva/sclera: Conjunctivae normal.      Pupils: Pupils are equal, round, and reactive to light. Neck:      Musculoskeletal: Normal range of motion and neck supple. Thyroid: No thyromegaly. Cardiovascular:      Rate and Rhythm: Normal rate and regular rhythm. Heart sounds: Normal heart sounds. No murmur. Pulmonary:      Effort: Pulmonary effort is normal.      Breath sounds: Normal breath sounds. No wheezing or rales. Abdominal:      Palpations: Abdomen is soft. Musculoskeletal: Normal range of motion. General: No tenderness or deformity. Lymphadenopathy:      Cervical: No cervical adenopathy. Skin:     General: Skin is warm and dry. Neurological:      Mental Status: She is alert and oriented to person, place, and time. Psychiatric:         Behavior: Behavior normal.         ASSESSMENT/PLAN  1. Internal nasal lesion  Trial of Bactroban topical.  She will follow up with Dr. Noemi Suarez ENT. - mupirocin (BACTROBAN) 2 % ointment; Apply topically 3 times daily  Dispense: 3 g; Refill: 1  - External Referral To ENT    2.  Essential hypertension  Chronic stable condition. Continue meds and healthy lifestyle  - Comprehensive Metabolic Panel; Future    3. Mixed hyperlipidemia  Recheck. If elevated will discuss possible medication  - Lipid Panel; Future    4. Encounter for screening mammogram for malignant neoplasm of breast    - BELL DIGITAL SCREEN W OR WO CAD BILATERAL; Future    5. Menopause    - DEXA BONE DENSITY AXIAL SKELETON; Future    6. Loss of sense of smell  Refer to Dr. Brittany Nunn  - External Referral To ENT    7. Non-seasonal allergic rhinitis, unspecified trigger  Continue saline spray. Sharon Du received counseling on the following healthy behaviors: nutrition, exercise and medication adherence  Reviewed prior labs and health maintenance. Continue current medications, diet and exercise. Discussed use, benefit, and side effects of prescribed medications. Barriers to medication compliance addressed. Patient given educational materials - see patient instructions. All patient questions answered. Patient voiced understanding. Return in about 6 months (around 5/24/2021) for htn.         Electronically signed by Raquel Acuna MD on 11/24/20 at 8:20 AM EST

## 2020-11-27 RX ORDER — HYDROCHLOROTHIAZIDE 25 MG/1
TABLET ORAL
Qty: 30 TABLET | Refills: 4 | Status: SHIPPED | OUTPATIENT
Start: 2020-11-27 | End: 2021-04-28

## 2021-04-28 DIAGNOSIS — I10 ESSENTIAL HYPERTENSION: ICD-10-CM

## 2021-04-28 RX ORDER — HYDROCHLOROTHIAZIDE 25 MG/1
TABLET ORAL
Qty: 30 TABLET | Refills: 5 | Status: SHIPPED | OUTPATIENT
Start: 2021-04-28 | End: 2021-10-30

## 2021-05-03 ENCOUNTER — TELEPHONE (OUTPATIENT)
Dept: FAMILY MEDICINE CLINIC | Age: 71
End: 2021-05-03

## 2021-05-07 ENCOUNTER — OFFICE VISIT (OUTPATIENT)
Dept: FAMILY MEDICINE CLINIC | Age: 71
End: 2021-05-07
Payer: MEDICARE

## 2021-05-07 VITALS
WEIGHT: 146 LBS | HEIGHT: 62 IN | HEART RATE: 75 BPM | OXYGEN SATURATION: 100 % | DIASTOLIC BLOOD PRESSURE: 70 MMHG | SYSTOLIC BLOOD PRESSURE: 132 MMHG | TEMPERATURE: 97.5 F | BODY MASS INDEX: 26.87 KG/M2

## 2021-05-07 DIAGNOSIS — N63.10 PAINFUL LUMPY RIGHT BREAST: Primary | ICD-10-CM

## 2021-05-07 DIAGNOSIS — Z12.31 ENCOUNTER FOR SCREENING MAMMOGRAM FOR MALIGNANT NEOPLASM OF BREAST: ICD-10-CM

## 2021-05-07 DIAGNOSIS — N64.4 PAINFUL LUMPY RIGHT BREAST: Primary | ICD-10-CM

## 2021-05-07 DIAGNOSIS — U07.1 COVID-19: ICD-10-CM

## 2021-05-07 PROCEDURE — 1123F ACP DISCUSS/DSCN MKR DOCD: CPT | Performed by: FAMILY MEDICINE

## 2021-05-07 PROCEDURE — G8427 DOCREV CUR MEDS BY ELIG CLIN: HCPCS | Performed by: FAMILY MEDICINE

## 2021-05-07 PROCEDURE — 3017F COLORECTAL CA SCREEN DOC REV: CPT | Performed by: FAMILY MEDICINE

## 2021-05-07 PROCEDURE — G8417 CALC BMI ABV UP PARAM F/U: HCPCS | Performed by: FAMILY MEDICINE

## 2021-05-07 PROCEDURE — G8400 PT W/DXA NO RESULTS DOC: HCPCS | Performed by: FAMILY MEDICINE

## 2021-05-07 PROCEDURE — 1090F PRES/ABSN URINE INCON ASSESS: CPT | Performed by: FAMILY MEDICINE

## 2021-05-07 PROCEDURE — 99213 OFFICE O/P EST LOW 20 MIN: CPT | Performed by: FAMILY MEDICINE

## 2021-05-07 PROCEDURE — 3288F FALL RISK ASSESSMENT DOCD: CPT | Performed by: FAMILY MEDICINE

## 2021-05-07 PROCEDURE — 4040F PNEUMOC VAC/ADMIN/RCVD: CPT | Performed by: FAMILY MEDICINE

## 2021-05-07 PROCEDURE — 1036F TOBACCO NON-USER: CPT | Performed by: FAMILY MEDICINE

## 2021-05-07 SDOH — ECONOMIC STABILITY: FOOD INSECURITY: WITHIN THE PAST 12 MONTHS, THE FOOD YOU BOUGHT JUST DIDN'T LAST AND YOU DIDN'T HAVE MONEY TO GET MORE.: NEVER TRUE

## 2021-05-07 SDOH — ECONOMIC STABILITY: FOOD INSECURITY: WITHIN THE PAST 12 MONTHS, YOU WORRIED THAT YOUR FOOD WOULD RUN OUT BEFORE YOU GOT MONEY TO BUY MORE.: NEVER TRUE

## 2021-05-07 ASSESSMENT — PATIENT HEALTH QUESTIONNAIRE - PHQ9
SUM OF ALL RESPONSES TO PHQ QUESTIONS 1-9: 0
SUM OF ALL RESPONSES TO PHQ QUESTIONS 1-9: 0

## 2021-05-07 ASSESSMENT — ENCOUNTER SYMPTOMS
COUGH: 0
SHORTNESS OF BREATH: 0
ALLERGIC/IMMUNOLOGIC NEGATIVE: 1
EYES NEGATIVE: 1

## 2021-05-07 NOTE — PROGRESS NOTES
[facility]  [facility]  [address]  [facility] [address]  Dept: [phone]    5/7/2021    CHIEF COMPLAINT    Chief Complaint   Patient presents with    Breast Mass     rt breast       HPI    Clemencia Montes De Oca is a 66 y.o. female who presents   Chief Complaint   Patient presents with    Breast Mass     rt breast   .  Patient presents with complaint of right breast pain. She noticed some aching in the upper outer quadrant and then tenderness when palpated. She has felt a lump on the lateral aspect. Last mammogram was January 2019 and was within normal limits. She had Covid infection in December 2020 with upper respiratory symptoms and loss of taste and smell. She has recovered completely and does not plan to get a Covid vaccine due to concerns about side effects. Vitals:    05/07/21 0857   BP: 132/70   Pulse: 75   Temp: 97.5 °F (36.4 °C)   SpO2: 100%   Weight: 146 lb (66.2 kg)   Height: 5' 2\" (1.575 m)       REVIEW OF SYSTEMS    Review of Systems   Constitutional: Negative for fatigue, fever and unexpected weight change. HENT: Negative. Eyes: Negative. Respiratory: Negative for cough and shortness of breath. Cardiovascular: Negative for chest pain. Endocrine: Negative. Musculoskeletal: Negative. Skin: Negative. Allergic/Immunologic: Negative. Hematological: Negative.         PAST MEDICAL HISTORY    Past Medical History:   Diagnosis Date    COVID-19 12/2020    Diverticulosis     Fatty liver 03/27/2019    Hyperlipidemia     Hypertension     Hypertriglyceridemia 03/27/2019    Irritable bowel syndrome     Rhinitis, allergic        FAMILY HISTORY    Family History   Problem Relation Age of Onset    High Blood Pressure Mother     Heart Failure Mother     High Blood Pressure Father     Stroke Father        SOCIAL HISTORY    Social History     Socioeconomic History    Marital status:      Spouse name: None    Number of children: 3    Years of education: None    Highest education level: None   Occupational History    None   Social Needs    Financial resource strain: Not hard at all   Isidoro-Kevin insecurity     Worry: Never true     Inability: Never true   [employer] needs     Medical: No     Non-medical: No   Tobacco Use    Smoking status: Never Smoker    Smokeless tobacco: Never Used   Substance and Sexual Activity    Alcohol use: No    Drug use: No    Sexual activity: Yes     Partners: Male   Lifestyle    Physical activity     Days per week: None     Minutes per session: None    Stress: None   Relationships    Social connections     Talks on phone: None     Gets together: None     Attends [employer] service: None     Active member of club or organization: None     Attends meetings of clubs or organizations: None     Relationship status: None    Intimate partner violence     Fear of current or ex partner: None     Emotionally abused: None     Physically abused: None     Forced sexual activity: None   Other Topics Concern    None   Social History Narrative    None       SURGICAL HISTORY    Past Surgical History:   Procedure Laterality Date     SECTION      CHOLECYSTECTOMY      COLONOSCOPY      CYST REMOVAL Left     breast    TONSILLECTOMY         CURRENT MEDICATIONS    Current Outpatient Medications   Medication Sig Dispense Refill    hydroCHLOROthiazide (HYDRODIURIL) 25 MG tablet TAKE ONE TABLET BY MOUTH DAILY 30 tablet 5    atenolol (TENORMIN) 25 MG tablet TAKE ONE TABLET BY MOUTH TWICE A DAY 60 tablet 5    Probiotic Product (PROBIOTIC & ACIDOPHILUS EX ST PO) Take by mouth      Multiple Vitamins-Minerals (MULTIVITAMIN ADULT PO) Take by mouth      mupirocin (BACTROBAN) 2 % ointment Apply topically 3 times daily (Patient not taking: Reported on 2021) 3 g 1     No current facility-administered medications for this visit.         ALLERGIES    Allergies   Allergen Reactions    Erythromycin     Sulfa Antibiotics Rash       PHYSICAL EXAM   Physical Exam  Vitals signs reviewed. Constitutional:       Appearance: She is well-developed. HENT:      Head: Normocephalic. Eyes:      Conjunctiva/sclera: Conjunctivae normal.      Pupils: Pupils are equal, round, and reactive to light. Neck:      Musculoskeletal: Normal range of motion and neck supple. Thyroid: No thyromegaly. Cardiovascular:      Rate and Rhythm: Normal rate and regular rhythm. Heart sounds: Normal heart sounds. No murmur. Pulmonary:      Effort: Pulmonary effort is normal.      Breath sounds: Normal breath sounds. No wheezing or rales. Chest:      Breasts:         Right: Mass and tenderness present. No inverted nipple, nipple discharge or skin change. Left: No inverted nipple, mass, nipple discharge, skin change or tenderness. Abdominal:      Palpations: Abdomen is soft. Tenderness: There is no abdominal tenderness. There is no guarding or rebound. Musculoskeletal: Normal range of motion. General: No tenderness or deformity. Lymphadenopathy:      Cervical: No cervical adenopathy. Upper Body:      Right upper body: No supraclavicular, axillary or pectoral adenopathy. Left upper body: No supraclavicular, axillary or pectoral adenopathy. Skin:     General: Skin is warm and dry. Neurological:      Mental Status: She is alert and oriented to person, place, and time. Psychiatric:         Mood and Affect: Mood normal.         Behavior: Behavior normal.         Thought Content: Thought content normal.         Judgment: Judgment normal.         ASSESSMENT/PLAN  1. Painful lumpy right breast  Patient had scheduled a screening mammogram in June but discussed the need for diagnostic mammogram and ultrasound at this time.   If she cannot get an appointment quickly at [facility] she will call back and we will fax the order to [address] [place] [address]

## 2021-05-20 ENCOUNTER — TELEPHONE (OUTPATIENT)
Dept: FAMILY MEDICINE CLINIC | Age: 71
End: 2021-05-20

## 2021-05-20 ENCOUNTER — HOSPITAL ENCOUNTER (OUTPATIENT)
Dept: MAMMOGRAPHY | Age: 71
Discharge: HOME | End: 2021-05-22
Payer: MEDICARE

## 2021-05-20 ENCOUNTER — HOSPITAL ENCOUNTER (OUTPATIENT)
Dept: ULTRASOUND IMAGING | Age: 71
Discharge: HOME | End: 2021-05-22
Payer: MEDICARE

## 2021-05-20 DIAGNOSIS — N63.10 PAINFUL LUMPY RIGHT BREAST: ICD-10-CM

## 2021-05-20 DIAGNOSIS — N64.4 PAINFUL LUMPY RIGHT BREAST: ICD-10-CM

## 2021-05-20 DIAGNOSIS — Z12.31 ENCOUNTER FOR SCREENING MAMMOGRAM FOR MALIGNANT NEOPLASM OF BREAST: ICD-10-CM

## 2021-05-20 DIAGNOSIS — R92.8 ABNORMAL MAMMOGRAM: Primary | ICD-10-CM

## 2021-05-20 PROCEDURE — G0279 TOMOSYNTHESIS, MAMMO: HCPCS

## 2021-05-20 PROCEDURE — 76642 ULTRASOUND BREAST LIMITED: CPT

## 2021-05-24 ENCOUNTER — HOSPITAL ENCOUNTER (OUTPATIENT)
Age: 71
Setting detail: SPECIMEN
Discharge: HOME | End: 2021-05-24
Payer: MEDICARE

## 2021-05-24 ENCOUNTER — OFFICE VISIT (OUTPATIENT)
Dept: FAMILY MEDICINE CLINIC | Age: 71
End: 2021-05-24
Payer: MEDICARE

## 2021-05-24 VITALS
SYSTOLIC BLOOD PRESSURE: 136 MMHG | HEIGHT: 62 IN | WEIGHT: 148.8 LBS | OXYGEN SATURATION: 99 % | HEART RATE: 93 BPM | DIASTOLIC BLOOD PRESSURE: 60 MMHG | BODY MASS INDEX: 27.38 KG/M2 | TEMPERATURE: 97.2 F

## 2021-05-24 DIAGNOSIS — R92.2 INCONCLUSIVE MAMMOGRAM: ICD-10-CM

## 2021-05-24 DIAGNOSIS — I10 ESSENTIAL HYPERTENSION: ICD-10-CM

## 2021-05-24 DIAGNOSIS — I10 ESSENTIAL HYPERTENSION: Primary | ICD-10-CM

## 2021-05-24 DIAGNOSIS — E78.2 MIXED HYPERLIPIDEMIA: ICD-10-CM

## 2021-05-24 DIAGNOSIS — N63.0 BREAST MASS IN FEMALE: ICD-10-CM

## 2021-05-24 DIAGNOSIS — U07.1 COVID-19: ICD-10-CM

## 2021-05-24 LAB
ABSOLUTE EOS #: 0.24 K/UL (ref 0–0.44)
ABSOLUTE IMMATURE GRANULOCYTE: 0.04 K/UL (ref 0–0.3)
ABSOLUTE LYMPH #: 1.48 K/UL (ref 1.1–3.7)
ABSOLUTE MONO #: 0.43 K/UL (ref 0.1–1.2)
ALBUMIN SERPL-MCNC: 4.6 G/DL (ref 3.5–5.2)
ALBUMIN/GLOBULIN RATIO: 1.9 (ref 1–2.5)
ALP BLD-CCNC: 57 U/L (ref 35–104)
ALT SERPL-CCNC: 15 U/L (ref 5–33)
ANION GAP SERPL CALCULATED.3IONS-SCNC: 13 MMOL/L (ref 9–17)
AST SERPL-CCNC: 17 U/L
BASOPHILS # BLD: 1 % (ref 0–2)
BASOPHILS ABSOLUTE: 0.06 K/UL (ref 0–0.2)
BILIRUB SERPL-MCNC: 0.35 MG/DL (ref 0.3–1.2)
BUN BLDV-MCNC: 18 MG/DL (ref 8–23)
BUN/CREAT BLD: ABNORMAL (ref 9–20)
CALCIUM SERPL-MCNC: 10 MG/DL (ref 8.6–10.4)
CHLORIDE BLD-SCNC: 102 MMOL/L (ref 98–107)
CHOLESTEROL/HDL RATIO: 5.6
CHOLESTEROL: 228 MG/DL
CO2: 27 MMOL/L (ref 20–31)
CREAT SERPL-MCNC: 0.67 MG/DL (ref 0.5–0.9)
DIFFERENTIAL TYPE: ABNORMAL
EOSINOPHILS RELATIVE PERCENT: 5 % (ref 1–4)
GFR AFRICAN AMERICAN: >60 ML/MIN
GFR NON-AFRICAN AMERICAN: >60 ML/MIN
GFR SERPL CREATININE-BSD FRML MDRD: ABNORMAL ML/MIN/{1.73_M2}
GFR SERPL CREATININE-BSD FRML MDRD: ABNORMAL ML/MIN/{1.73_M2}
GLUCOSE BLD-MCNC: 120 MG/DL (ref 70–99)
HCT VFR BLD CALC: 36.5 % (ref 36.3–47.1)
HDLC SERPL-MCNC: 41 MG/DL
HEMOGLOBIN: 12.2 G/DL (ref 11.9–15.1)
IMMATURE GRANULOCYTES: 1 %
LDL CHOLESTEROL: 121 MG/DL (ref 0–130)
LYMPHOCYTES # BLD: 28 % (ref 24–43)
MCH RBC QN AUTO: 29 PG (ref 25.2–33.5)
MCHC RBC AUTO-ENTMCNC: 33.4 G/DL (ref 28.4–34.8)
MCV RBC AUTO: 86.7 FL (ref 82.6–102.9)
MONOCYTES # BLD: 8 % (ref 3–12)
NRBC AUTOMATED: 0 PER 100 WBC
PDW BLD-RTO: 12.2 % (ref 11.8–14.4)
PLATELET # BLD: 210 K/UL (ref 138–453)
PLATELET ESTIMATE: ABNORMAL
PMV BLD AUTO: 11 FL (ref 8.1–13.5)
POTASSIUM SERPL-SCNC: 4.3 MMOL/L (ref 3.7–5.3)
RBC # BLD: 4.21 M/UL (ref 3.95–5.11)
RBC # BLD: ABNORMAL 10*6/UL
SEG NEUTROPHILS: 57 % (ref 36–65)
SEGMENTED NEUTROPHILS ABSOLUTE COUNT: 2.99 K/UL (ref 1.5–8.1)
SODIUM BLD-SCNC: 142 MMOL/L (ref 135–144)
TOTAL PROTEIN: 7 G/DL (ref 6.4–8.3)
TRIGL SERPL-MCNC: 329 MG/DL
VLDLC SERPL CALC-MCNC: ABNORMAL MG/DL (ref 1–30)
WBC # BLD: 5.2 K/UL (ref 3.5–11.3)
WBC # BLD: ABNORMAL 10*3/UL

## 2021-05-24 PROCEDURE — 1123F ACP DISCUSS/DSCN MKR DOCD: CPT | Performed by: FAMILY MEDICINE

## 2021-05-24 PROCEDURE — 36415 COLL VENOUS BLD VENIPUNCTURE: CPT | Performed by: FAMILY MEDICINE

## 2021-05-24 PROCEDURE — 1036F TOBACCO NON-USER: CPT | Performed by: FAMILY MEDICINE

## 2021-05-24 PROCEDURE — G8427 DOCREV CUR MEDS BY ELIG CLIN: HCPCS | Performed by: FAMILY MEDICINE

## 2021-05-24 PROCEDURE — 4040F PNEUMOC VAC/ADMIN/RCVD: CPT | Performed by: FAMILY MEDICINE

## 2021-05-24 PROCEDURE — 99214 OFFICE O/P EST MOD 30 MIN: CPT | Performed by: FAMILY MEDICINE

## 2021-05-24 PROCEDURE — 3017F COLORECTAL CA SCREEN DOC REV: CPT | Performed by: FAMILY MEDICINE

## 2021-05-24 PROCEDURE — 1090F PRES/ABSN URINE INCON ASSESS: CPT | Performed by: FAMILY MEDICINE

## 2021-05-24 PROCEDURE — G8417 CALC BMI ABV UP PARAM F/U: HCPCS | Performed by: FAMILY MEDICINE

## 2021-05-24 PROCEDURE — G8400 PT W/DXA NO RESULTS DOC: HCPCS | Performed by: FAMILY MEDICINE

## 2021-05-24 ASSESSMENT — ENCOUNTER SYMPTOMS
SHORTNESS OF BREATH: 0
CONSTIPATION: 0
DIARRHEA: 0
BLOOD IN STOOL: 0
ALLERGIC/IMMUNOLOGIC NEGATIVE: 1
EYES NEGATIVE: 1
COUGH: 0
ABDOMINAL PAIN: 0

## 2021-05-24 NOTE — PROGRESS NOTES
[facility]  [facility]  [address]  [facility] [address]  Dept: [phone]    5/24/2021    CHIEF COMPLAINT    Chief Complaint   Patient presents with    Hypertension     6 month       HPI    Garrett Puentes is a 66 y.o. female who presents   Chief Complaint   Patient presents with    Hypertension     6 month   . Recheck hypertension. Taking medications as prescribed with no side effects. Blood pressure has been in the normal range. She has not done the lab work that was previously ordered but is fasting today. She has had a mammogram and ultrasound for right breast mass and pain. She is scheduled for a biopsy in 2 days. The reading reported highly suspicious for malignancy which patient is aware of. She is wondering what the next step will be. She was ill with Covid in December. Symptoms completely resolved. She is not interested in getting a vaccine at this time. Hypertension  This is a chronic problem. The current episode started more than 1 year ago. The problem is controlled. Pertinent negatives include no chest pain, headaches or shortness of breath. Risk factors for coronary artery disease include post-menopausal state. Past treatments include lifestyle changes, beta blockers and diuretics. The current treatment provides significant improvement. There are no compliance problems. Vitals:    05/24/21 0803   BP: 136/60   Pulse: 93   Temp: 97.2 °F (36.2 °C)   SpO2: 99%   Weight: 148 lb 12.8 oz (67.5 kg)   Height: 5' 2\" (1.575 m)       REVIEW OF SYSTEMS    Review of Systems   Constitutional: Negative for fatigue, fever and unexpected weight change. HENT: Negative. Eyes: Negative. Respiratory: Negative for cough and shortness of breath. Cardiovascular: Negative for chest pain and leg swelling. Gastrointestinal: Negative for abdominal pain, blood in stool, constipation and diarrhea. Endocrine: Negative. Genitourinary: Negative for frequency and urgency. Musculoskeletal: Negative. Skin: Negative. Allergic/Immunologic: Negative. Neurological: Negative for dizziness and headaches. Hematological: Negative. Psychiatric/Behavioral: Negative for sleep disturbance. The patient is not nervous/anxious. PAST MEDICAL HISTORY    Past Medical History:   Diagnosis Date    COVID-19 12/2020    Diverticulosis     Fatty liver 03/27/2019    Hyperlipidemia     Hypertension     Hypertriglyceridemia 03/27/2019    Irritable bowel syndrome     Rhinitis, allergic        FAMILY HISTORY    Family History   Problem Relation Age of Onset    High Blood Pressure Mother     Heart Failure Mother     High Blood Pressure Father     Stroke Father        SOCIAL HISTORY    Social History     Socioeconomic History    Marital status:      Spouse name: None    Number of children: 3    Years of education: None    Highest education level: None   Occupational History    None   Tobacco Use    Smoking status: Never Smoker    Smokeless tobacco: Never Used   Substance and Sexual Activity    Alcohol use: No    Drug use: No    Sexual activity: Yes     Partners: Male   Other Topics Concern    None   Social History Narrative    None     Social Determinants of Health     Financial Resource Strain: Low Risk     Difficulty of Paying Living Expenses: Not hard at all   Food Insecurity: No Food Insecurity    Worried About Running Out of Food in the Last Year: Never true    Sarah of Food in the Last Year: Never true   Transportation Needs: No Transportation Needs    Lack of Transportation (Medical): No    Lack of Transportation (Non-Medical):  No   Physical Activity:     Days of Exercise per Week:     Minutes of Exercise per Session:    Stress:     Feeling of Stress :    Social Connections:     Frequency of Communication with Friends and Family:     Frequency of Social Gatherings with Friends and Family:     Attends Mandaeism Services:     Active Member of Clubs or Organizations:     Attends Club or Organization Meetings:     Marital Status:    Intimate Partner Violence:     Fear of Current or Ex-Partner:     Emotionally Abused:     Physically Abused:     Sexually Abused:        SURGICAL HISTORY    Past Surgical History:   Procedure Laterality Date     SECTION      CHOLECYSTECTOMY      COLONOSCOPY      CYST REMOVAL Left     breast    TONSILLECTOMY         CURRENT MEDICATIONS    Current Outpatient Medications   Medication Sig Dispense Refill    hydroCHLOROthiazide (HYDRODIURIL) 25 MG tablet TAKE ONE TABLET BY MOUTH DAILY 30 tablet 5    atenolol (TENORMIN) 25 MG tablet TAKE ONE TABLET BY MOUTH TWICE A DAY 60 tablet 5    Multiple Vitamins-Minerals (MULTIVITAMIN ADULT PO) Take by mouth      mupirocin (BACTROBAN) 2 % ointment Apply topically 3 times daily (Patient not taking: Reported on 2021) 3 g 1    Probiotic Product (PROBIOTIC & ACIDOPHILUS EX ST PO) Take by mouth (Patient not taking: Reported on 2021)       No current facility-administered medications for this visit. ALLERGIES    Allergies   Allergen Reactions    Erythromycin     Sulfa Antibiotics Rash       PHYSICAL EXAM   Physical Exam  Vitals reviewed. Constitutional:       Appearance: She is well-developed. HENT:      Head: Normocephalic. Eyes:      Conjunctiva/sclera: Conjunctivae normal.      Pupils: Pupils are equal, round, and reactive to light. Neck:      Thyroid: No thyromegaly. Cardiovascular:      Rate and Rhythm: Normal rate and regular rhythm. Heart sounds: Normal heart sounds. No murmur heard. Pulmonary:      Effort: Pulmonary effort is normal.      Breath sounds: Normal breath sounds. No wheezing or rales. Chest:      Breasts:         Right: Mass and tenderness present. No nipple discharge or skin change. Abdominal:      Palpations: Abdomen is soft.    Musculoskeletal:         General: No tenderness or deformity. Normal range of motion. Cervical back: Normal range of motion and neck supple. Lymphadenopathy:      Cervical: No cervical adenopathy. Skin:     General: Skin is warm and dry. Neurological:      Mental Status: She is alert and oriented to person, place, and time. Psychiatric:         Mood and Affect: Mood normal.         Behavior: Behavior normal.         Thought Content: Thought content normal.         Judgment: Judgment normal.         ASSESSMENT/PLAN  1. Essential hypertension  Chronic and stable. Continue medications and healthy lifestyle  - Comprehensive Metabolic Panel; Future    2. Breast mass in female  She will be getting a biopsy in 2 days. Discussed probable referral to breast surgeon. Suggested Dr. Farideh Rivera. Discussed that if biopsy is positive for malignancy that I would also refer her to an oncologist and suggested Dr. Nelli Joseph.  - CBC Auto Differential; Future    3. Mixed hyperlipidemia  Chronic and stable. Continue healthy diet  - Lipid Panel; Future    4. Inconclusive mammogram   As per #2  - CBC Auto Differential; Future    5. COVID-19  No further symptoms. Patient declined vaccine. Larissa Love received counseling on the following healthy behaviors: nutrition, exercise and medication adherence  Reviewed prior labs and health maintenance. Continue current medications, diet and exercise. Discussed use, benefit, and side effects of prescribed medications. Barriers to medication compliance addressed. Patient given educational materials - see patient instructions. All patient questions answered. Patient voiced understanding. Return in about 6 months (around 11/24/2021) for htn.         Electronically signed by Elena Willson MD on 5/24/21 at 8:08 AM EDT

## 2021-05-26 ENCOUNTER — HOSPITAL ENCOUNTER (OUTPATIENT)
Dept: MAMMOGRAPHY | Age: 71
Discharge: HOME | End: 2021-05-28
Payer: MEDICARE

## 2021-05-26 ENCOUNTER — HOSPITAL ENCOUNTER (OUTPATIENT)
Dept: ULTRASOUND IMAGING | Age: 71
Discharge: HOME | End: 2021-05-28
Payer: MEDICARE

## 2021-05-26 DIAGNOSIS — R92.8 ABNORMAL MAMMOGRAM: ICD-10-CM

## 2021-05-26 DIAGNOSIS — Z98.890 STATUS POST BREAST BIOPSY: ICD-10-CM

## 2021-05-26 PROCEDURE — 2500000003 HC RX 250 WO HCPCS

## 2021-05-26 PROCEDURE — 77065 DX MAMMO INCL CAD UNI: CPT

## 2021-05-26 PROCEDURE — 88377 M/PHMTRC ALYS ISHQUANT/SEMIQ: CPT

## 2021-05-26 PROCEDURE — 88360 TUMOR IMMUNOHISTOCHEM/MANUAL: CPT

## 2021-05-26 PROCEDURE — 19083 BX BREAST 1ST LESION US IMAG: CPT

## 2021-05-26 PROCEDURE — 88305 TISSUE EXAM BY PATHOLOGIST: CPT

## 2021-05-28 ENCOUNTER — TELEPHONE (OUTPATIENT)
Dept: FAMILY MEDICINE CLINIC | Age: 71
End: 2021-05-28

## 2021-05-28 DIAGNOSIS — C50.911 INVASIVE DUCTAL CARCINOMA OF BREAST, FEMALE, RIGHT (HCC): Primary | ICD-10-CM

## 2021-05-28 LAB — SURGICAL PATHOLOGY REPORT: NORMAL

## 2021-05-28 NOTE — TELEPHONE ENCOUNTER
Spoke with patient concerning results of breast biopsy.   Referral made to Dr. Bradford Lewis and contact information given to patient

## 2021-06-01 DIAGNOSIS — C50.911 INVASIVE DUCTAL CARCINOMA OF BREAST, FEMALE, RIGHT (HCC): Primary | ICD-10-CM

## 2021-06-01 NOTE — TELEPHONE ENCOUNTER
Patient called to schedule appointment with Dr. Braxton Hebert but was not able to get appointment until 6-14-21. She was worried if she needed to be seen sooner. Also she has concerns about the report, she was told by 's office that report has not came back. Patient wants call back from PCP.

## 2021-06-15 ENCOUNTER — TELEPHONE (OUTPATIENT)
Dept: FAMILY MEDICINE CLINIC | Age: 71
End: 2021-06-15

## 2021-06-15 NOTE — TELEPHONE ENCOUNTER
----- Message from Susan Ortizell sent at 6/15/2021  9:52 AM EDT -----  Subject: Message to Provider    QUESTIONS  Information for Provider? Pt need Oncologist name that Dr. Pavel Singletary referred   her to. Pt did see the surgeon and has triple neg breast cancer and needs   to know whether to do Chemo or Surgery first. Can not remember the   Oncologist's name.  ---------------------------------------------------------------------------  --------------  [employer]  What is the best way for the office to contact you? OK to leave message on   voicemail  Preferred Call Back Phone Number? [phone]  ---------------------------------------------------------------------------  --------------  SCRIPT ANSWERS  Relationship to Patient?  Self

## 2021-06-16 ENCOUNTER — TELEPHONE (OUTPATIENT)
Dept: ONCOLOGY | Age: 71
End: 2021-06-16

## 2021-06-17 ENCOUNTER — HOSPITAL ENCOUNTER (OUTPATIENT)
Dept: MRI IMAGING | Age: 71
Discharge: HOME | End: 2021-06-19
Payer: MEDICARE

## 2021-06-17 DIAGNOSIS — C50.911 INVASIVE DUCTAL CARCINOMA OF BREAST, FEMALE, RIGHT (HCC): ICD-10-CM

## 2021-06-17 PROCEDURE — A9579 GAD-BASE MR CONTRAST NOS,1ML: HCPCS | Performed by: FAMILY MEDICINE

## 2021-06-17 PROCEDURE — 6360000004 HC RX CONTRAST MEDICATION: Performed by: FAMILY MEDICINE

## 2021-06-17 PROCEDURE — 77049 MRI BREAST C-+ W/CAD BI: CPT

## 2021-06-17 PROCEDURE — 2580000003 HC RX 258: Performed by: FAMILY MEDICINE

## 2021-06-17 RX ORDER — SODIUM CHLORIDE 0.9 % (FLUSH) 0.9 %
10 SYRINGE (ML) INJECTION PRN
Status: DISCONTINUED | OUTPATIENT
Start: 2021-06-17 | End: 2021-06-20 | Stop reason: HOSPADM

## 2021-06-17 RX ORDER — 0.9 % SODIUM CHLORIDE 0.9 %
80 INTRAVENOUS SOLUTION INTRAVENOUS ONCE
Status: COMPLETED | OUTPATIENT
Start: 2021-06-17 | End: 2021-06-17

## 2021-06-17 RX ADMIN — SODIUM CHLORIDE 80 ML: 9 INJECTION, SOLUTION INTRAVENOUS at 07:26

## 2021-06-17 RX ADMIN — GADOTERIDOL 15 ML: 279.3 INJECTION, SOLUTION INTRAVENOUS at 07:26

## 2021-06-17 RX ADMIN — SODIUM CHLORIDE, PRESERVATIVE FREE 10 ML: 5 INJECTION INTRAVENOUS at 07:26

## 2021-06-18 ENCOUNTER — PREP FOR PROCEDURE (OUTPATIENT)
Dept: SURGERY | Age: 71
End: 2021-06-18

## 2021-06-18 RX ORDER — ACETAMINOPHEN 325 MG/1
1000 TABLET ORAL ONCE
Status: CANCELLED | OUTPATIENT
Start: 2021-06-24

## 2021-06-21 ENCOUNTER — HOSPITAL ENCOUNTER (OUTPATIENT)
Dept: PREADMISSION TESTING | Age: 71
Discharge: HOME | End: 2021-06-25
Payer: MEDICARE

## 2021-06-21 VITALS
HEART RATE: 74 BPM | WEIGHT: 138.67 LBS | DIASTOLIC BLOOD PRESSURE: 72 MMHG | TEMPERATURE: 98.8 F | BODY MASS INDEX: 25.52 KG/M2 | OXYGEN SATURATION: 100 % | RESPIRATION RATE: 16 BRPM | SYSTOLIC BLOOD PRESSURE: 145 MMHG | HEIGHT: 62 IN

## 2021-06-21 PROCEDURE — 93005 ELECTROCARDIOGRAM TRACING: CPT | Performed by: ANESTHESIOLOGY

## 2021-06-21 NOTE — H&P
History and Physical Service   [facility]    HISTORY AND PHYSICAL EXAMINATION            Date of Evaluation: 2021  Patient name:  Zev Broderick  MRN:   [id number]  YOB: 1950  PCP:    Nehemias Sparks MD    History Obtained From:     Patient, medical records    History of Present Illness: This is Zev Broderick a 66 y.o. female who presents today for a PRE-TESTING APPOINTMENT PROR TO A RIGHT BREAST LUMPECTOMY FOLLOWING POSSIBLE WIRE LOCALIZATION AND SENTINEL NODE BIOPSY ON 21 @13;30  by Dr. Daniel Valdes  for TREATMENT OF INVASIVE DUCTAL CARCINOMA GRADE 2 ER-, [place] - HER 2 -. THE PATIENT HAS CHANGED HER MIND TO HAVING LUMPECTOMY VS MASTECTOMY. SHE BRODERICK CONTACT DR. Daniel Valdes THIS AFTERNOON. GYNECOLOGICAL HISTORY;  MENARCHE  [age]  14.5. G 10, P 8. SAB 2. AGE OF FIRST PREGNANCY 19 YEARS. SHE DID NOT TAKE BIRTH CONTROL PILLS OR HORMONE REPLACEMENTS. COUSIN HAD BREAST CANCER. FATHER'S SIDE HISTORY  IS UNKNOWN TO HER. MENOPAUSAL AGE 47.       Past Medical History:     Past Medical History:   Diagnosis Date    COVID-19 2020    Diverticulosis     Fatty liver 2019    Hyperlipidemia     Hypertension     Hypertriglyceridemia 2019    Irritable bowel syndrome     Rhinitis, allergic         Past Surgical History:     Past Surgical History:   Procedure Laterality Date     SECTION      CHOLECYSTECTOMY      COLONOSCOPY      CYST REMOVAL Left     breast    HERNIA REPAIR      TONSILLECTOMY      US BREAST NEEDLE BIOPSY RIGHT Right 2021    US BREAST NEEDLE BIOPSY RIGHT 2021 [facility]TRAS[facility]        Medications Prior to Admission:     Prior to Admission medications    Medication Sig Start Date End Date Taking?  Authorizing Provider   hydroCHLOROthiazide (HYDRODIURIL) 25 MG tablet TAKE ONE TABLET BY MOUTH DAILY  Patient taking differently: Take 25 mg by mouth daily TAKE ONE TABLET BY MOUTH DAILY 21  Yes Nehemias Sparks MD   atenolol (TENORMIN) 25 MG tablet TAKE ONE TABLET BY MOUTH TWICE A DAY  Patient taking differently: Take 25 mg by mouth 2 times daily  5/15/20  Yes Roger Blackburn MD   Multiple Vitamins-Minerals (MULTIVITAMIN ADULT PO) Take 1 tablet by mouth    Yes Historical Provider, MD        Allergies:     Erythromycin and Sulfa antibiotics    Social History:     Tobacco:    reports that she has never smoked. She has never used smokeless tobacco.  Alcohol:      reports no history of alcohol use. Drug Use:  reports no history of drug use. Family History:     Family History   Problem Relation Age of Onset    High Blood Pressure Mother     Heart Failure Mother     High Blood Pressure Father     Stroke Father        Review of Systems:     Positive and Negative as described in HPI. CONSTITUTIONAL:  negative for fevers, chills, sweats, fatigue, weight loss  HEENT:  WEARS GLASSES for vision, hearing changes, runny nose, throat pain  RESPIRATORY:  negative for shortness of breath, cough, congestion, wheezing. CARDIOVASCULAR:  negative for chest pain, palpitations. HAS HYPERTENSION  GASTROINTESTINAL:  negative for nausea, vomiting, diarrhea, constipation, change in bowel habits, abdominal pain   GENITOURINARY:  negative for difficulty of urination, burning with urination, frequency   INTEGUMENT:  negative for rash, skin lesions, easy bruising   HEMATOLOGIC/LYMPHATIC:  negative for swelling/edema   ALLERGIC/IMMUNOLOGIC:  negative for urticaria , itching  ENDOCRINE:  negative increase in drinking, increase in urination, hot or cold intolerance  MUSCULOSKELETAL:  negative joint pains, muscle aches, swelling of joints  NEUROLOGICAL:  negative for headaches, dizziness, lightheadedness, numbness, pain, tingling extremities  BEHAVIOR/PSYCH:  negative for depression, anxiety    Physical Exam:   BP (!) 145/72   Pulse 74   Temp 98.8 °F (37.1 °C) (Oral)   Resp 16   Ht 5' 2\" (1.575 m)   Wt 138 lb 10.7 oz (62.9 kg)   SpO2 100%   BMI 25.36 kg/m²   No LMP recorded. Patient is postmenopausal.  [id number]  No results for input(s): POCGLU in the last 72 hours. General Appearance:  alert, well appearing, and in no acute distress  Mental status: oriented to person, place, and time with normal affect  Head:  normocephalic, atraumatic. Eye: no icterus, redness, pupils equal and reactive, extraocular eye movements intact, conjunctiva clear  Ear: normal external ear, no discharge, hearing intact  Nose:  no drainage noted  Mouth: mucous membranes moist  Neck: supple, no carotid bruits, thyroid not palpable  Lungs: Bilateral equal air entry, clear to ausculation, no wheezing, rales or rhonchi, normal effort  Cardiovascular: HR Normal rate, regular rhythm, no murmur, gallop, rub. Abdomen: Soft, nontender, nondistended, normal bowel sounds  Neurologic: There are no new focal motor or sensory deficits, normal muscle tone and bulk, no abnormal sensation, normal speech, cranial nerves II through XII grossly intact  Skin: No gross lesions, rashes, bruising or bleeding on exposed skin area  Extremities:  peripheral pulses palpable, no pedal edema or calf pain with palpation  Psych: normal affect     Investigations:      Laboratory Testing:  No results found for this or any previous visit (from the past 24 hour(s)). Recent Labs     05/24/21  1932   HGB 12.2   HCT 36.5   WBC 5.2   MCV 86.7      K 4.3      CO2 27   BUN 18   CREATININE 0.67   GLUCOSE 120*   AST 17   ALT 15   LABALBU 4.6       No results for input(s): COVID19 in the last 720 hours. Imaging/Diagnostics:    MRI BREAST BILATERAL W WO CONTRAST    Result Date: 6/17/2021  EXAMINATION: MRI OF THE BILATERAL BREASTS WITHOUT AND WITH CONTRAST 6/17/2021 7:26 am TECHNIQUE: Multiplanar multisequence MRI of the bilateral breasts were performed without and with the administration of intravenous contrast. Data analysis was performed with color parametric mapping, image subtraction, and 3D reconstructions.  COMPARISON: Mammogram dated 05/26/2021, 05/20/2021, 01/18/2019, 02/01/2016. Ultrasound dated 05/20/2021. HISTORY: ORDERING SYSTEM PROVIDED HISTORY: Invasive ductal carcinoma of breast, female, right [facility] TECHNOLOGIST PROVIDED HISTORY: Reason for Exam: Invasive ductal carcinoma of breast, female, right Acuity: Unknown Type of Exam: Initial Biopsy on 05/26/2021. FINDINGS: Fibroglandular tissue: Scattered fibroglandular tissue. Background parenchymal enhancement: Minimal. Right breast: The biopsy-proven malignant mass demonstrates homogeneous enhancement in the 10 o'clock right breast, posterior third, approximately 8 cm from the nipple. The mass has spiculated margins and measures 1.3 x 1.5 x 1.4 cm on MRI. Rapid washout kinetics. Biopsy clip is seen within the mass. Fat plane between the mass and pectoralis muscle measures approximately 0.5 cm. There is no abnormal enhancement in the underlying pectoralis musculature. No abnormal skin thickening or enhancement. No other suspicious mass or abnormal enhancement seen within the right breast. Left breast: No suspicious mass or abnormal enhancement. Lymph nodes: No axillary or internal mammary lymphadenopathy. Extra mammary: No additional findings. 1.  Right breast: Known 1.5 cm malignant mass in the posterior 10 o'clock right breast.  Fat plane between the mass and pectoralis muscle measures approximately 0.5 cm. No evidence of chest wall invasion. No evidence of multifocal disease. No lymphadenopathy. 2.  Left breast: No MR evidence of malignancy. Annual screening mammogram recommended. BI-RADS 6 BIRADS: BIRADS - CATEGORY 6 Known Biopsy-Proven Cancer. Appropriate action should be taken. OVERALL ASSESSMENT - KNOWN BIOPSY PROVEN MALIGNANCY.      MAMMOGRAM POST BX CLIP PLACEMENT LEFT    Addendum Date: 6/1/2021    ADDENDUM: Radiology/pathology correlation: Pathology results from an ultrasound-guided biopsy performed on a mass in the 10 o'clock right breast demonstrates invasive ductal carcinoma, grade 2. ER negative/DE negative. The result is malignant and concordant. Surgical and oncologic consultation are recommended. BIRADS - CATEGORY 6 Known Biopsy-Proven Cancer. Appropriate action should be taken. OVERALL ASSESSMENT - KNOWN BIOPSY PROVEN MALIGNANCY. BI-RADS 6     Result Date: 6/1/2021  EXAMINATION: ULTRASOUND-GUIDED right BREAST BIOPSY WITH VACUUM-ASSIST ULTRASOUND-GUIDED CLIP PLACEMENT POSTPROCEDURE DIGITAL MAMMOGRAM 5/26/2021 HISTORY: ORDERING SYSTEM PROVIDED HISTORY: Status post breast biopsy COMPARISON: Mammogram and ultrasound dated 05/20/2021 TECHNIQUE: A timeout was performed to confirm patient identification and site of procedure. Risks, benefits, and alternatives of the procedure were discussed. Informed written consent was obtained. Transverse and longitudinal gray scale images were obtained of the hypoechoic mass in the 10 o'clock right breast, 8 cm from the nipple. The biopsy site was prepped in standard fashion. Buffered 2% lidocaine was used for superficial local anesthesia. 2% lidocaine with epinephrine was used for deeper local anesthesia. A small skin incision was made. A 12-gauge, vacuum-assisted biopsy needle was advanced under sonographic guidance via a lateral approach. 6 cores were obtained and the needle was removed. A HydroMARKbiopsy clip was placed at the biopsy site under ultrasound guidance. Pressure was applied for hemostasis. The patient tolerated the procedure well with no immediate complications. POSTPROCEDURE MAMMOGRAM: ML and CC views of the right breast were obtained immediately following the procedure. The biopsy clip is in the correct location with respect to the targeted site. There is no evidence of biopsy clip migration from the biopsy site. Post biopsy clip placement imaging performed in a separate room.      Technically successful ultrasound guided core biopsy of a hypoechoic mass in the 10 o'clock right breast and HydroMARKclip marker placement as described above. BIRADS: ZW - Pathology pending. US BREAST BIOPSY W LOC DEVICE 1ST LESION RIGHT    Addendum Date: 6/1/2021    ADDENDUM: Radiology/pathology correlation: Pathology results from an ultrasound-guided biopsy performed on a mass in the 10 o'clock right breast demonstrates invasive ductal carcinoma, grade 2. ER negative/AR negative. The result is malignant and concordant. Surgical and oncologic consultation are recommended. BIRADS - CATEGORY 6 Known Biopsy-Proven Cancer. Appropriate action should be taken. OVERALL ASSESSMENT - KNOWN BIOPSY PROVEN MALIGNANCY. BI-RADS 6     Result Date: 6/1/2021  EXAMINATION: ULTRASOUND-GUIDED right BREAST BIOPSY WITH VACUUM-ASSIST ULTRASOUND-GUIDED CLIP PLACEMENT POSTPROCEDURE DIGITAL MAMMOGRAM 5/26/2021 HISTORY: ORDERING SYSTEM PROVIDED HISTORY: Status post breast biopsy COMPARISON: Mammogram and ultrasound dated 05/20/2021 TECHNIQUE: A timeout was performed to confirm patient identification and site of procedure. Risks, benefits, and alternatives of the procedure were discussed. Informed written consent was obtained. Transverse and longitudinal gray scale images were obtained of the hypoechoic mass in the 10 o'clock right breast, 8 cm from the nipple. The biopsy site was prepped in standard fashion. Buffered 2% lidocaine was used for superficial local anesthesia. 2% lidocaine with epinephrine was used for deeper local anesthesia. A small skin incision was made. A 12-gauge, vacuum-assisted biopsy needle was advanced under sonographic guidance via a lateral approach. 6 cores were obtained and the needle was removed. A HydroMARKbiopsy clip was placed at the biopsy site under ultrasound guidance. Pressure was applied for hemostasis. The patient tolerated the procedure well with no immediate complications. POSTPROCEDURE MAMMOGRAM: ML and CC views of the right breast were obtained immediately following the procedure.  The biopsy clip is in the correct location with respect to the targeted site. There is no evidence of biopsy clip migration from the biopsy site. Post biopsy clip placement imaging performed in a separate room. Technically successful ultrasound guided core biopsy of a hypoechoic mass in the 10 o'clock right breast and HydroMARKclip marker placement as described above. BIRADS: ZW - Pathology pending. Diagnosis:      1. INVASIVE DUCTAL CARCINOMA GRADE  2  RIGHT BREAST     Plans:     1. RIGHT BREAST LUMPECTOMY WITH SENTINEL  NODE BIOPSY.       Julia Perry, APRN - CNP  6/21/2021  10:03 AM

## 2021-06-21 NOTE — PRE-PROCEDURE INSTRUCTIONS
[address] [place] [address] Thursday, June 24, 2021 at 10:00 AM    Once you enter the hospital lobby, take the elevators to the second floor. Check-In is at the surgery registration desk      Continue to take your home medications as you normally do up to and including the night before surgery with the exception of any blood thinning medications. Please stop any blood thinning medications as directed by your surgeon or prescribing physician. Failure to stop certain medications may interfere with your scheduled surgery. These may include:  Aspirin, Warfarin (Coumadin), Clopidogrel (Plavix), Ibuprofen (Motrin, Advil), Naproxen (Aleve), Meloxicam (Mobic), Celecoxib (Celebrex), Eliquis, Pradaxa, Xarelto, Effient, Fish Oil, Herbal supplements. Please take the following medication(s) the day of surgery with a small sip of water:  atenolol          PREPARING FOR YOUR SURGERY:     Before surgery, you can play an important role in your own health. Because skin is not sterile, we need to be sure that your skin is as free of germs as possible before surgery by carefully washing before surgery. Preparing or prepping skin before surgery can reduce the risk of a surgical site infection.   Do not shave the area of your body where your surgery will be performed unless you received specific permission from your physician. You will need to shower at home the night before surgery and the morning of surgery with a special soap called chlorhexidine gluconate (CHG*). *Not to be used by people allergic to Chlorhexidine Gluconate (CHG). Following these instructions will help you be sure that your skin is clean before surgery. Instructions on cleaning your skin before surgery: The night before your surgery:      You will need to shower with warm water (not hot) and the CHG soap.  Use a clean wash cloth and a clean towel. Have clean clothes available to put on after the shower.        First wash your hair with regular shampoo. Rinse your hair and body thoroughly to remove the shampoo.  Wash your face and genital area (private parts) with your regular soap or water only. Thoroughly rinse your body with warm water from the neck down.  Turn water off to prevent rinsing the soap off too soon.  With a clean wet washcloth and half of the CHG soap in the bottle, lather your entire body from the neck down. Do not use CHG soap near your eyes or ears to avoid injury to those areas.  Wash thoroughly, paying special attention to the area where your surgery will be performed.  Wash your body gently for five (5) minutes. Avoid scrubbing your skin too hard.  Turn the water back on and rinse your body thoroughly.  Pat yourself dry with a clean, soft towel. Do not apply lotion, cream or powder.  Dress with clean freshly washed clothes. The morning of surgery:     Repeat shower following steps above - using remaining half of CHG soap in bottle. Patient Instructions:    [place] If you are having any type of anesthesia you are to have nothing to eat or drink after midnight the night before your surgery. This includes gum, hard candy, mints, water or smoking or chewing tobacco.  The only exception to this is a small sip of water to take with any morning dose of heart, blood pressure, or seizure medications. No alcoholic beverages for 24 hours prior to surgery.  Brush your teeth but do not swallow water.  Bring your eye glasses and case with you. No contacts are to be worn the day of surgery. You also may bring your hearing aids. Most surgical procedures involving anesthesia will require that you remove your dentures prior to surgery. · Do not wear any jewelry or body piercings day of surgery. Also, NO lotion, perfume or deodorant to be used the day of surgery.   No nail polish on the operative extremity (arm/leg surgeries)    · If you are staying overnight with us, please bring

## 2021-06-22 LAB
EKG ATRIAL RATE: 69 BPM
EKG P AXIS: 63 DEGREES
EKG P-R INTERVAL: 192 MS
EKG Q-T INTERVAL: 404 MS
EKG QRS DURATION: 88 MS
EKG QTC CALCULATION (BAZETT): 432 MS
EKG R AXIS: 2 DEGREES
EKG T AXIS: 18 DEGREES
EKG VENTRICULAR RATE: 69 BPM

## 2021-06-23 ENCOUNTER — ANESTHESIA EVENT (OUTPATIENT)
Dept: OPERATING ROOM | Age: 71
End: 2021-06-23
Payer: MEDICARE

## 2021-06-24 ENCOUNTER — APPOINTMENT (OUTPATIENT)
Dept: GENERAL RADIOLOGY | Age: 71
End: 2021-06-24
Attending: SURGERY
Payer: MEDICARE

## 2021-06-24 ENCOUNTER — HOSPITAL ENCOUNTER (OUTPATIENT)
Dept: NUCLEAR MEDICINE | Age: 71
Discharge: HOME | End: 2021-06-26
Payer: MEDICARE

## 2021-06-24 ENCOUNTER — HOSPITAL ENCOUNTER (OUTPATIENT)
Age: 71
Setting detail: OUTPATIENT SURGERY
Discharge: HOME | End: 2021-06-24
Attending: SURGERY | Admitting: SURGERY
Payer: MEDICARE

## 2021-06-24 ENCOUNTER — ANESTHESIA (OUTPATIENT)
Dept: OPERATING ROOM | Age: 71
End: 2021-06-24
Payer: MEDICARE

## 2021-06-24 ENCOUNTER — TELEPHONE (OUTPATIENT)
Dept: ONCOLOGY | Age: 71
End: 2021-06-24

## 2021-06-24 VITALS
WEIGHT: 138.67 LBS | HEIGHT: 62 IN | HEART RATE: 78 BPM | RESPIRATION RATE: 17 BRPM | BODY MASS INDEX: 25.52 KG/M2 | DIASTOLIC BLOOD PRESSURE: 70 MMHG | SYSTOLIC BLOOD PRESSURE: 168 MMHG | TEMPERATURE: 97.2 F | OXYGEN SATURATION: 97 %

## 2021-06-24 VITALS — TEMPERATURE: 96.6 F | SYSTOLIC BLOOD PRESSURE: 189 MMHG | DIASTOLIC BLOOD PRESSURE: 91 MMHG | OXYGEN SATURATION: 100 %

## 2021-06-24 DIAGNOSIS — C50.411 MALIGNANT NEOPLASM OF UPPER-OUTER QUADRANT OF RIGHT FEMALE BREAST, UNSPECIFIED ESTROGEN RECEPTOR STATUS (HCC): ICD-10-CM

## 2021-06-24 DIAGNOSIS — C50.411 MALIGNANT NEOPLASM OF UPPER-OUTER QUADRANT OF RIGHT BREAST IN FEMALE, ESTROGEN RECEPTOR NEGATIVE (HCC): Primary | ICD-10-CM

## 2021-06-24 DIAGNOSIS — Z17.1 MALIGNANT NEOPLASM OF UPPER-OUTER QUADRANT OF RIGHT BREAST IN FEMALE, ESTROGEN RECEPTOR NEGATIVE (HCC): Primary | ICD-10-CM

## 2021-06-24 LAB
SARS-COV-2, RAPID: NOT DETECTED
SPECIMEN DESCRIPTION: NORMAL

## 2021-06-24 PROCEDURE — 6360000002 HC RX W HCPCS: Performed by: NURSE ANESTHETIST, CERTIFIED REGISTERED

## 2021-06-24 PROCEDURE — 88360 TUMOR IMMUNOHISTOCHEM/MANUAL: CPT

## 2021-06-24 PROCEDURE — 2500000003 HC RX 250 WO HCPCS: Performed by: NURSE ANESTHETIST, CERTIFIED REGISTERED

## 2021-06-24 PROCEDURE — 3600000002 HC SURGERY LEVEL 2 BASE: Performed by: SURGERY

## 2021-06-24 PROCEDURE — 88305 TISSUE EXAM BY PATHOLOGIST: CPT

## 2021-06-24 PROCEDURE — 3430000000 HC RX DIAGNOSTIC RADIOPHARMACEUTICAL

## 2021-06-24 PROCEDURE — A9520 TC99 TILMANOCEPT DIAG 0.5MCI: HCPCS

## 2021-06-24 PROCEDURE — 2720000010 HC SURG SUPPLY STERILE: Performed by: SURGERY

## 2021-06-24 PROCEDURE — A9520 TC99 TILMANOCEPT DIAG 0.5MCI: HCPCS | Performed by: SURGERY

## 2021-06-24 PROCEDURE — 7100000011 HC PHASE II RECOVERY - ADDTL 15 MIN: Performed by: SURGERY

## 2021-06-24 PROCEDURE — 88304 TISSUE EXAM BY PATHOLOGIST: CPT

## 2021-06-24 PROCEDURE — 7100000001 HC PACU RECOVERY - ADDTL 15 MIN: Performed by: SURGERY

## 2021-06-24 PROCEDURE — 6370000000 HC RX 637 (ALT 250 FOR IP): Performed by: SURGERY

## 2021-06-24 PROCEDURE — 2580000003 HC RX 258: Performed by: ANESTHESIOLOGY

## 2021-06-24 PROCEDURE — 2500000003 HC RX 250 WO HCPCS: Performed by: SURGERY

## 2021-06-24 PROCEDURE — 88307 TISSUE EXAM BY PATHOLOGIST: CPT

## 2021-06-24 PROCEDURE — 88342 IMHCHEM/IMCYTCHM 1ST ANTB: CPT

## 2021-06-24 PROCEDURE — 78195 LYMPH SYSTEM IMAGING: CPT

## 2021-06-24 PROCEDURE — 6360000002 HC RX W HCPCS: Performed by: SURGERY

## 2021-06-24 PROCEDURE — 71046 X-RAY EXAM CHEST 2 VIEWS: CPT

## 2021-06-24 PROCEDURE — 7100000010 HC PHASE II RECOVERY - FIRST 15 MIN: Performed by: SURGERY

## 2021-06-24 PROCEDURE — 3700000000 HC ANESTHESIA ATTENDED CARE: Performed by: SURGERY

## 2021-06-24 PROCEDURE — 7100000000 HC PACU RECOVERY - FIRST 15 MIN: Performed by: SURGERY

## 2021-06-24 PROCEDURE — 3600000012 HC SURGERY LEVEL 2 ADDTL 15MIN: Performed by: SURGERY

## 2021-06-24 PROCEDURE — 88377 M/PHMTRC ALYS ISHQUANT/SEMIQ: CPT

## 2021-06-24 PROCEDURE — 87635 SARS-COV-2 COVID-19 AMP PRB: CPT

## 2021-06-24 PROCEDURE — 2709999900 HC NON-CHARGEABLE SUPPLY: Performed by: SURGERY

## 2021-06-24 PROCEDURE — 3430000000 HC RX DIAGNOSTIC RADIOPHARMACEUTICAL: Performed by: SURGERY

## 2021-06-24 PROCEDURE — 3700000001 HC ADD 15 MINUTES (ANESTHESIA): Performed by: SURGERY

## 2021-06-24 RX ORDER — HYDROCODONE BITARTRATE AND ACETAMINOPHEN 5; 325 MG/1; MG/1
1 TABLET ORAL PRN
Status: DISCONTINUED | OUTPATIENT
Start: 2021-06-24 | End: 2021-06-24 | Stop reason: HOSPADM

## 2021-06-24 RX ORDER — SODIUM CHLORIDE 9 MG/ML
INJECTION, SOLUTION INTRAVENOUS CONTINUOUS
Status: DISCONTINUED | OUTPATIENT
Start: 2021-06-25 | End: 2021-06-24

## 2021-06-24 RX ORDER — HYDROCODONE BITARTRATE AND ACETAMINOPHEN 5; 325 MG/1; MG/1
2 TABLET ORAL PRN
Status: DISCONTINUED | OUTPATIENT
Start: 2021-06-24 | End: 2021-06-24 | Stop reason: HOSPADM

## 2021-06-24 RX ORDER — SODIUM CHLORIDE 0.9 % (FLUSH) 0.9 %
10 SYRINGE (ML) INJECTION PRN
Status: DISCONTINUED | OUTPATIENT
Start: 2021-06-24 | End: 2021-06-24 | Stop reason: HOSPADM

## 2021-06-24 RX ORDER — EPHEDRINE SULFATE/0.9% NACL/PF 50 MG/5 ML
SYRINGE (ML) INTRAVENOUS PRN
Status: DISCONTINUED | OUTPATIENT
Start: 2021-06-24 | End: 2021-06-24 | Stop reason: SDUPTHER

## 2021-06-24 RX ORDER — FENTANYL CITRATE 50 UG/ML
25 INJECTION, SOLUTION INTRAMUSCULAR; INTRAVENOUS EVERY 5 MIN PRN
Status: DISCONTINUED | OUTPATIENT
Start: 2021-06-24 | End: 2021-06-24 | Stop reason: HOSPADM

## 2021-06-24 RX ORDER — DEXAMETHASONE SODIUM PHOSPHATE 10 MG/ML
INJECTION, SOLUTION INTRAMUSCULAR; INTRAVENOUS PRN
Status: DISCONTINUED | OUTPATIENT
Start: 2021-06-24 | End: 2021-06-24 | Stop reason: SDUPTHER

## 2021-06-24 RX ORDER — SUCCINYLCHOLINE/SOD CL,ISO/PF 100 MG/5ML
SYRINGE (ML) INTRAVENOUS PRN
Status: DISCONTINUED | OUTPATIENT
Start: 2021-06-24 | End: 2021-06-24 | Stop reason: SDUPTHER

## 2021-06-24 RX ORDER — GLYCOPYRROLATE 1 MG/5 ML
SYRINGE (ML) INTRAVENOUS PRN
Status: DISCONTINUED | OUTPATIENT
Start: 2021-06-24 | End: 2021-06-24 | Stop reason: SDUPTHER

## 2021-06-24 RX ORDER — MAGNESIUM SULFATE IN WATER 40 MG/ML
2000 INJECTION, SOLUTION INTRAVENOUS ONCE
Status: COMPLETED | OUTPATIENT
Start: 2021-06-24 | End: 2021-06-24

## 2021-06-24 RX ORDER — ROCURONIUM BROMIDE 10 MG/ML
INJECTION, SOLUTION INTRAVENOUS PRN
Status: DISCONTINUED | OUTPATIENT
Start: 2021-06-24 | End: 2021-06-24 | Stop reason: SDUPTHER

## 2021-06-24 RX ORDER — ONDANSETRON 2 MG/ML
4 INJECTION INTRAMUSCULAR; INTRAVENOUS
Status: DISCONTINUED | OUTPATIENT
Start: 2021-06-24 | End: 2021-06-24 | Stop reason: HOSPADM

## 2021-06-24 RX ORDER — ONDANSETRON 2 MG/ML
INJECTION INTRAMUSCULAR; INTRAVENOUS PRN
Status: DISCONTINUED | OUTPATIENT
Start: 2021-06-24 | End: 2021-06-24 | Stop reason: SDUPTHER

## 2021-06-24 RX ORDER — SODIUM CHLORIDE 0.9 % (FLUSH) 0.9 %
10 SYRINGE (ML) INJECTION EVERY 12 HOURS SCHEDULED
Status: DISCONTINUED | OUTPATIENT
Start: 2021-06-24 | End: 2021-06-24 | Stop reason: HOSPADM

## 2021-06-24 RX ORDER — SODIUM CHLORIDE 9 MG/ML
25 INJECTION, SOLUTION INTRAVENOUS PRN
Status: DISCONTINUED | OUTPATIENT
Start: 2021-06-24 | End: 2021-06-24 | Stop reason: HOSPADM

## 2021-06-24 RX ORDER — HYDROCODONE BITARTRATE AND ACETAMINOPHEN 5; 325 MG/1; MG/1
1 TABLET ORAL EVERY 6 HOURS PRN
Qty: 10 TABLET | Refills: 0 | Status: SHIPPED | OUTPATIENT
Start: 2021-06-24 | End: 2021-06-29

## 2021-06-24 RX ORDER — FENTANYL CITRATE 50 UG/ML
INJECTION, SOLUTION INTRAMUSCULAR; INTRAVENOUS PRN
Status: DISCONTINUED | OUTPATIENT
Start: 2021-06-24 | End: 2021-06-24 | Stop reason: SDUPTHER

## 2021-06-24 RX ORDER — LIDOCAINE 40 MG/G
CREAM TOPICAL
Status: COMPLETED | OUTPATIENT
Start: 2021-06-24 | End: 2021-06-24

## 2021-06-24 RX ORDER — BUPIVACAINE HYDROCHLORIDE 2.5 MG/ML
INJECTION, SOLUTION EPIDURAL; INFILTRATION; INTRACAUDAL PRN
Status: DISCONTINUED | OUTPATIENT
Start: 2021-06-24 | End: 2021-06-24 | Stop reason: ALTCHOICE

## 2021-06-24 RX ORDER — PROPOFOL 10 MG/ML
INJECTION, EMULSION INTRAVENOUS PRN
Status: DISCONTINUED | OUTPATIENT
Start: 2021-06-24 | End: 2021-06-24 | Stop reason: SDUPTHER

## 2021-06-24 RX ORDER — PROPOFOL 10 MG/ML
INJECTION, EMULSION INTRAVENOUS CONTINUOUS PRN
Status: DISCONTINUED | OUTPATIENT
Start: 2021-06-24 | End: 2021-06-24 | Stop reason: SDUPTHER

## 2021-06-24 RX ORDER — NEOSTIGMINE METHYLSULFATE 5 MG/5 ML
SYRINGE (ML) INTRAVENOUS PRN
Status: DISCONTINUED | OUTPATIENT
Start: 2021-06-24 | End: 2021-06-24 | Stop reason: SDUPTHER

## 2021-06-24 RX ORDER — LIDOCAINE 40 MG/G
CREAM TOPICAL
Status: CANCELLED | OUTPATIENT
Start: 2021-06-24

## 2021-06-24 RX ORDER — KETAMINE HCL IN NACL, ISO-OSM 100MG/10ML
SYRINGE (ML) INJECTION PRN
Status: DISCONTINUED | OUTPATIENT
Start: 2021-06-24 | End: 2021-06-24 | Stop reason: SDUPTHER

## 2021-06-24 RX ORDER — ACETAMINOPHEN 500 MG
1000 TABLET ORAL ONCE
Status: COMPLETED | OUTPATIENT
Start: 2021-06-24 | End: 2021-06-24

## 2021-06-24 RX ORDER — SODIUM CHLORIDE, SODIUM LACTATE, POTASSIUM CHLORIDE, CALCIUM CHLORIDE 600; 310; 30; 20 MG/100ML; MG/100ML; MG/100ML; MG/100ML
INJECTION, SOLUTION INTRAVENOUS CONTINUOUS
Status: DISCONTINUED | OUTPATIENT
Start: 2021-06-25 | End: 2021-06-24 | Stop reason: HOSPADM

## 2021-06-24 RX ORDER — LIDOCAINE HYDROCHLORIDE 20 MG/ML
INJECTION, SOLUTION EPIDURAL; INFILTRATION; INTRACAUDAL; PERINEURAL PRN
Status: DISCONTINUED | OUTPATIENT
Start: 2021-06-24 | End: 2021-06-24 | Stop reason: SDUPTHER

## 2021-06-24 RX ORDER — FENTANYL CITRATE 50 UG/ML
50 INJECTION, SOLUTION INTRAMUSCULAR; INTRAVENOUS EVERY 5 MIN PRN
Status: DISCONTINUED | OUTPATIENT
Start: 2021-06-24 | End: 2021-06-24 | Stop reason: HOSPADM

## 2021-06-24 RX ORDER — LIDOCAINE HYDROCHLORIDE 10 MG/ML
1 INJECTION, SOLUTION EPIDURAL; INFILTRATION; INTRACAUDAL; PERINEURAL
Status: DISCONTINUED | OUTPATIENT
Start: 2021-06-25 | End: 2021-06-24 | Stop reason: HOSPADM

## 2021-06-24 RX ADMIN — Medication 3 MG: at 15:42

## 2021-06-24 RX ADMIN — Medication 10 MG: at 15:27

## 2021-06-24 RX ADMIN — PROPOFOL 180 MG: 10 INJECTION, EMULSION INTRAVENOUS at 14:08

## 2021-06-24 RX ADMIN — ACETAMINOPHEN 1000 MG: 500 TABLET ORAL at 11:04

## 2021-06-24 RX ADMIN — Medication 100 MG: at 14:20

## 2021-06-24 RX ADMIN — Medication 5 MG: at 14:45

## 2021-06-24 RX ADMIN — Medication 25 MG: at 14:08

## 2021-06-24 RX ADMIN — ONDANSETRON 4 MG: 2 INJECTION, SOLUTION INTRAMUSCULAR; INTRAVENOUS at 15:44

## 2021-06-24 RX ADMIN — Medication 10 MG: at 15:23

## 2021-06-24 RX ADMIN — PROPOFOL 20 MG: 10 INJECTION, EMULSION INTRAVENOUS at 14:18

## 2021-06-24 RX ADMIN — DEXAMETHASONE SODIUM PHOSPHATE 20 MG: 10 INJECTION INTRAMUSCULAR; INTRAVENOUS at 14:30

## 2021-06-24 RX ADMIN — CEFAZOLIN 2000 MG: 10 INJECTION, POWDER, FOR SOLUTION INTRAVENOUS at 14:24

## 2021-06-24 RX ADMIN — TILMANOCEPT 0.6 MILLICURIE: KIT at 12:13

## 2021-06-24 RX ADMIN — Medication 50 MCG: at 14:55

## 2021-06-24 RX ADMIN — Medication 10 MG: at 15:30

## 2021-06-24 RX ADMIN — MAGNESIUM SULFATE HEPTAHYDRATE 2000 MG: 40 INJECTION, SOLUTION INTRAVENOUS at 14:43

## 2021-06-24 RX ADMIN — Medication 10 MG: at 14:40

## 2021-06-24 RX ADMIN — Medication 10 MG: at 15:00

## 2021-06-24 RX ADMIN — PROPOFOL 30 MCG/KG/MIN: 10 INJECTION, EMULSION INTRAVENOUS at 14:25

## 2021-06-24 RX ADMIN — SODIUM CHLORIDE, POTASSIUM CHLORIDE, SODIUM LACTATE AND CALCIUM CHLORIDE: 600; 310; 30; 20 INJECTION, SOLUTION INTRAVENOUS at 11:03

## 2021-06-24 RX ADMIN — Medication 5 MG: at 15:30

## 2021-06-24 RX ADMIN — LIDOCAINE HYDROCHLORIDE 100 MG: 20 INJECTION, SOLUTION EPIDURAL; INFILTRATION; INTRACAUDAL; PERINEURAL at 14:08

## 2021-06-24 RX ADMIN — Medication 50 MCG: at 14:04

## 2021-06-24 RX ADMIN — Medication 5 MG: at 15:11

## 2021-06-24 RX ADMIN — SODIUM CHLORIDE, POTASSIUM CHLORIDE, SODIUM LACTATE AND CALCIUM CHLORIDE: 600; 310; 30; 20 INJECTION, SOLUTION INTRAVENOUS at 15:16

## 2021-06-24 RX ADMIN — ROCURONIUM BROMIDE 20 MG: 10 INJECTION, SOLUTION INTRAVENOUS at 14:21

## 2021-06-24 RX ADMIN — LIDOCAINE 4%: 4 CREAM TOPICAL at 10:56

## 2021-06-24 RX ADMIN — Medication 0.6 MG: at 15:43

## 2021-06-24 RX ADMIN — Medication 10 MG: at 15:17

## 2021-06-24 ASSESSMENT — PULMONARY FUNCTION TESTS
PIF_VALUE: 13
PIF_VALUE: 12
PIF_VALUE: 13
PIF_VALUE: 11
PIF_VALUE: 13
PIF_VALUE: 12
PIF_VALUE: 14
PIF_VALUE: 13
PIF_VALUE: 0
PIF_VALUE: 14
PIF_VALUE: 13
PIF_VALUE: 12
PIF_VALUE: 13
PIF_VALUE: 12
PIF_VALUE: 3
PIF_VALUE: 11
PIF_VALUE: 13
PIF_VALUE: 11
PIF_VALUE: 13
PIF_VALUE: 12
PIF_VALUE: 12
PIF_VALUE: 13
PIF_VALUE: 13
PIF_VALUE: 12
PIF_VALUE: 12
PIF_VALUE: 7
PIF_VALUE: 11
PIF_VALUE: 2
PIF_VALUE: 12
PIF_VALUE: 12
PIF_VALUE: 11
PIF_VALUE: 11
PIF_VALUE: 3
PIF_VALUE: 13
PIF_VALUE: 12
PIF_VALUE: 2
PIF_VALUE: 13
PIF_VALUE: 11
PIF_VALUE: 13
PIF_VALUE: 24
PIF_VALUE: 0
PIF_VALUE: 0
PIF_VALUE: 28
PIF_VALUE: 4
PIF_VALUE: 1
PIF_VALUE: 12
PIF_VALUE: 10
PIF_VALUE: 13
PIF_VALUE: 22
PIF_VALUE: 13
PIF_VALUE: 14
PIF_VALUE: 12
PIF_VALUE: 11
PIF_VALUE: 13
PIF_VALUE: 1
PIF_VALUE: 3
PIF_VALUE: 14
PIF_VALUE: 12
PIF_VALUE: 18
PIF_VALUE: 12
PIF_VALUE: 13
PIF_VALUE: 12
PIF_VALUE: 37
PIF_VALUE: 11
PIF_VALUE: 13
PIF_VALUE: 13
PIF_VALUE: 11
PIF_VALUE: 0
PIF_VALUE: 13
PIF_VALUE: 12
PIF_VALUE: 11
PIF_VALUE: 22
PIF_VALUE: 12
PIF_VALUE: 13
PIF_VALUE: 12
PIF_VALUE: 13
PIF_VALUE: 0
PIF_VALUE: 12
PIF_VALUE: 13
PIF_VALUE: 13
PIF_VALUE: 12
PIF_VALUE: 13
PIF_VALUE: 13
PIF_VALUE: 14
PIF_VALUE: 1
PIF_VALUE: 20
PIF_VALUE: 11
PIF_VALUE: 19
PIF_VALUE: 13
PIF_VALUE: 11
PIF_VALUE: 12
PIF_VALUE: 11
PIF_VALUE: 13
PIF_VALUE: 11
PIF_VALUE: 2
PIF_VALUE: 14
PIF_VALUE: 42
PIF_VALUE: 13
PIF_VALUE: 13
PIF_VALUE: 12
PIF_VALUE: 13

## 2021-06-24 ASSESSMENT — PAIN SCALES - GENERAL
PAINLEVEL_OUTOF10: 0

## 2021-06-24 ASSESSMENT — ENCOUNTER SYMPTOMS: SHORTNESS OF BREATH: 0

## 2021-06-24 ASSESSMENT — PAIN - FUNCTIONAL ASSESSMENT: PAIN_FUNCTIONAL_ASSESSMENT: 0-10

## 2021-06-24 NOTE — OP NOTE
Operative Note    Blanca Palma  [id number]  Preoperative diagnosis: Right breast cancer triple negative biology    Postoperative diagnosis: As above    Procedure: Right breast lumpectomy. Right sentinel node biopsies x2. PECS 2 block. Anesthesia: General    Surgeon: Dr. Yo Valencia    Indications: This 59-year-old patient is admitted today for treatment of a recently diagnosed right breast cancer clinically stage I triple negative biology. After discussing options for treatment the patient wished to proceed with breast conserving surgery. Procedure: The patient was previously injected with technetium sulfur colloid in preparation for the sentinel node biopsies. She was brought to the surgical suite and given a general anesthesia. Ancef 2 g was given intravenously prior to the incision. Pneumatic cuffs were placed on both legs and continued throughout the case. A warming blanket was placed over the lower torso. The entire right chest, right axilla, right arm and neck were prepped with Hibiclens soap and draped in a sterile fashion. I started with the PECS 2 block. The ultrasound transducer was draped sterilely and brought into the field. I started in the midclavicular line and identified the axillary vessels as well as the clavicle and the third rib. The transducer was then moved slightly inferior and lateral to the fourth rib. At this level I could identify the pectoralis major, pectoralis minor and serratus anterior muscles. A 22-gauge spinal needle was introduced under ultrasound guidance and passed in the interfascial plane between the serratus anterior and pectoralis minor muscle. Aspiration was performed without seeing any blood and then 10 cc of quarter percent plain Marcaine was infiltrated very easily. The needle was then withdrawn slightly to the interfascial plane between the pectoralis minor and major muscles.   The needle was aspirated without blood and then 10 cc of quarter percent plain Marcaine was infiltrated in this plane. I scanned with ultrasound to make sure there was no hematoma formation. I also evaluated the pleura to make sure that there was no pneumothorax by ultrasound imaging. At this point identified the right breast cancer with the ultrasound which is in the upper outer aspect of the right breast.  A curved incision was made through the skin into the subcutaneous tissues. An upper and lower skin flap were created. I then removed this mass along with some surrounding breast tissue. The entire piece was carefully oriented for pathology and sent to pathology for frozen section for margins. It should be noted that the cancer was in a thin area of the very upper outer aspect of the right breast close to the lower axillary border. The pathologist called back stating that the margins were free but the anterior and superior margins were closest at 3 mm. I did take additional tissue from both of these margins which was sent separately and oriented appropriately for pathology. Hemostasis was obtained. Retractors were placed for better visualization for the axilla. I then used the harmonic focus to dissected superiorly into the lower axilla. By palpation there were no abnormal lymph nodes. The neoprobe was brought into the wound and identified the first hotspot in the lower central portion. A lymph node was removed with counts of 250. The neoprobe was used to scan the axilla again where a second lymph node was identified slightly superior. This lymph node was excised with counts of 600. I reexamined the entire axilla and did not find any elevated counts over 10. These 2 sentinel nodes were sent to pathology for permanent review as right sentinel nodes. Hemostasis was obtained. 1/4% plain Marcaine was infiltrated along the skin edges and slightly deeper in the axilla for a total of 40 cc.   The dermis was reapproximated with sutures of 3-0 Monocryl followed by the skin with a running subcuticular stitch of 4-0 Monocryl. Dermabond was applied followed by a sterile dressing and an Ace wrap. At this point the patient was returned to the recovery room in good condition. Sponge and needle counts are correct.   Estimated blood loss is minimal.

## 2021-06-24 NOTE — ANESTHESIA POSTPROCEDURE EVALUATION
Department of Anesthesiology  Postprocedure Note    Patient: Mary Ann Conte  MRN: [id number]  YOB: 1950  Date of evaluation: 6/24/2021  Time:  5:41 PM     Procedure Summary     Date: 06/24/21 Room / Location: [facility] 01 / [facility] - INPATIENT    Anesthesia Start: 3913 Anesthesia Stop: 7802    Procedure: RIGHT BREAST  LUMPECTOMY WITH SENTINEL NODE BIOPSY AND PECS2 BLOCK (Right Chest) Diagnosis: (DX RIGHT BREAST CA)    Surgeons: Carissa Unger MD Responsible Provider: Radha Stout MD    Anesthesia Type: general ASA Status: 2          Anesthesia Type: general    Joby Phase I: Joby Score: 10    Joby Phase II: Joby Score: 10    Last vitals: Reviewed and per EMR flowsheets.        Anesthesia Post Evaluation    Complications: no

## 2021-06-24 NOTE — ANESTHESIA PRE PROCEDURE
Facility-Administered Medications Ordered in Other Encounters   Medication Dose Route Frequency Provider Last Rate Last Admin    technetium Tc 99m tilmanocept (LYMPHOSEEK) injection 0.6 millicurie  766 micro curie Subcutaneous ONCE PRN Jah Rouse MD           Allergies: Allergies   Allergen Reactions    Erythromycin     Sulfa Antibiotics Rash       Problem List:    Patient Active Problem List   Diagnosis Code    Diverticulosis K57.90    Hypertension I10    Hyperlipidemia E78.5    Irritable bowel syndrome K58.9    Fatty liver K76.0    Hypertriglyceridemia E78.1    Allergies T78.40XA    Rhinitis, allergic J30.9    COVID-19 U07.1       Past Medical History:        Diagnosis Date    COVID-19 2020    Diverticulosis     Fatty liver 2019    Hyperlipidemia     Hypertension     Hypertriglyceridemia 2019    Irritable bowel syndrome     Rhinitis, allergic        Past Surgical History:        Procedure Laterality Date     SECTION      CHOLECYSTECTOMY      COLONOSCOPY      CYST REMOVAL Left     breast    HERNIA REPAIR      TONSILLECTOMY      US BREAST NEEDLE BIOPSY RIGHT Right 2021    US BREAST NEEDLE BIOPSY RIGHT 2021 [facility]TRAS[facility]       Social History:    Social History     Tobacco Use    Smoking status: Never Smoker    Smokeless tobacco: Never Used   Substance Use Topics    Alcohol use:  No                                Counseling given: Not Answered      Vital Signs (Current):   Vitals:    21 1033 21 1039   BP: (!) 15681    Pulse: 70    Resp: 14    Temp: 97.1 °F (36.2 °C)    TempSrc: Temporal    SpO2: 98%    Weight:  138 lb 10.7 oz (62.9 kg)   Height:  5' 2\" (1.575 m)                                              BP Readings from Last 3 Encounters:   21 (!) 156/81   21 (!) 145/72   21 136/60       NPO Status: Time of last liquid consumption: 1900                        Time of last solid consumption:  Date of last liquid consumption: 06/23/21                        Date of last solid food consumption: 06/23/21    BMI:   Wt Readings from Last 3 Encounters:   06/24/21 138 lb 10.7 oz (62.9 kg)   06/21/21 138 lb 10.7 oz (62.9 kg)   05/24/21 148 lb 12.8 oz (67.5 kg)     Body mass index is 25.36 kg/m². CBC:   Lab Results   Component Value Date    WBC 5.2 05/24/2021    RBC 4.21 05/24/2021    HGB 12.2 05/24/2021    HCT 36.5 05/24/2021    MCV 86.7 05/24/2021    RDW 12.2 05/24/2021     05/24/2021       CMP:   Lab Results   Component Value Date     05/24/2021    K 4.3 05/24/2021     05/24/2021    CO2 27 05/24/2021    BUN 18 05/24/2021    CREATININE 0.67 05/24/2021    GFRAA >60 05/24/2021    LABGLOM >60 05/24/2021    GLUCOSE 120 05/24/2021    PROT 7.0 05/24/2021    CALCIUM 10.0 05/24/2021    BILITOT 0.35 05/24/2021    ALKPHOS 57 05/24/2021    AST 17 05/24/2021    ALT 15 05/24/2021       POC Tests: No results for input(s): POCGLU, POCNA, POCK, POCCL, POCBUN, POCHEMO, POCHCT in the last 72 hours.     Coags: No results found for: PROTIME, INR, APTT    HCG (If Applicable): No results found for: PREGTESTUR, PREGSERUM, HCG, HCGQUANT     ABGs: No results found for: PHART, PO2ART, XZT9RKF, QIS7ONL, BEART, J8KNGTHV     Type & Screen (If Applicable):  No results found for: LABABO, LABRH    Drug/Infectious Status (If Applicable):  Lab Results   Component Value Date    HEPCAB NONREACTIVE 02/08/2018       COVID-19 Screening (If Applicable):   Lab Results   Component Value Date    COVID19 Not Detected 06/24/2021           Anesthesia Evaluation    Airway: Mallampati: I  TM distance: >3 FB   Neck ROM: full  Mouth opening: > = 3 FB Dental:          Pulmonary:       (-) shortness of breath                           Cardiovascular:    (+) hypertension:,     (-)  angina                Neuro/Psych:               GI/Hepatic/Renal:             Endo/Other:                     Abdominal:           Vascular: Anesthesia Plan      general     ASA 2             Anesthetic plan and risks discussed with patient.                       Adis Bernal MD   6/24/2021

## 2021-06-24 NOTE — H&P
CO2, BUN, CREATININE, GLUCOSE, INR, PROTIME, APTT, AST, ALT, LABALBU, HCG in the last 720 hours. No results for input(s): COVID19 in the last 720 hours. KAI Valencia CNP  Electronically signed 6/24/2021 at 10:43 AM    KAI Hsu CNP   Nurse Practitioner   General Surgery   H&P       Cosign Needed   Date of Service:  6/21/2021  9:00 AM         Related encounter: Pre-Admission Testing Visit from 6/21/2021 in [facility] PRE-ADMIT [facility] All     Show:Clear all  [x]Manual[x]Template[]Copied    Added by:  [x]KAI Freeman CNP    []Neva for details  History and Physical Service   [address] [facility]            Date of Evaluation:     6/21/2021  Patient name:              Aracely Hull  MRN:                           [id number]  YOB: 1950  PCP:                            Janine Salazar MD     History Obtained From:      Patient, medical records     History of Present Illness: This is Aracely Hull a 66 y.o. female who presents today for a PRE-TESTING APPOINTMENT PROR TO A RIGHT BREAST LUMPECTOMY FOLLOWING POSSIBLE WIRE LOCALIZATION AND SENTINEL NODE BIOPSY ON 6/24/21 @13;30  by Dr. Brenda Neri  for TREATMENT OF INVASIVE DUCTAL CARCINOMA GRADE 2 ER-, AL - HER 2 -. THE PATIENT HAS CHANGED HER MIND TO HAVING LUMPECTOMY VS MASTECTOMY. SHE BRODERICK CONTACT DR. Brenda Neri THIS AFTERNOON. GYNECOLOGICAL HISTORY;  MENARCHE  [age]  14.5. G 10, P 8. SAB 2. AGE OF FIRST PREGNANCY 19 YEARS. SHE DID NOT TAKE BIRTH CONTROL PILLS OR HORMONE REPLACEMENTS. COUSIN HAD BREAST CANCER. FATHER'S SIDE HISTORY  IS UNKNOWN TO HER.  MENOPAUSAL AGE 46.         Past Medical History:      Past Medical History        Past Medical History:   Diagnosis Date    COVID-19 12/2020    Diverticulosis      Fatty liver 03/27/2019    Hyperlipidemia      Hypertension      Hypertriglyceridemia 03/27/2019 GASTROINTESTINAL:  negative for nausea, vomiting, diarrhea, constipation, change in bowel habits, abdominal pain   GENITOURINARY:  negative for difficulty of urination, burning with urination, frequency   INTEGUMENT:  negative for rash, skin lesions, easy bruising   HEMATOLOGIC/LYMPHATIC:  negative for swelling/edema   ALLERGIC/IMMUNOLOGIC:  negative for urticaria , itching  ENDOCRINE:  negative increase in drinking, increase in urination, hot or cold intolerance  MUSCULOSKELETAL:  negative joint pains, muscle aches, swelling of joints  NEUROLOGICAL:  negative for headaches, dizziness, lightheadedness, numbness, pain, tingling extremities  BEHAVIOR/PSYCH:  negative for depression, anxiety     Physical Exam:   BP (!) 145/72   Pulse 74   Temp 98.8 °F (37.1 °C) (Oral)   Resp 16   Ht 5' 2\" (1.575 m)   Wt 138 lb 10.7 oz (62.9 kg)   SpO2 100%   BMI 25.36 kg/m²   No LMP recorded. Patient is postmenopausal.  [id number]  No results for input(s): POCGLU in the last 72 hours. General Appearance:  alert, well appearing, and in no acute distress  Mental status: oriented to person, place, and time with normal affect  Head:  normocephalic, atraumatic. Eye: no icterus, redness, pupils equal and reactive, extraocular eye movements intact, conjunctiva clear  Ear: normal external ear, no discharge, hearing intact  Nose:  no drainage noted  Mouth: mucous membranes moist  Neck: supple, no carotid bruits, thyroid not palpable  Lungs: Bilateral equal air entry, clear to ausculation, no wheezing, rales or rhonchi, normal effort  Cardiovascular: HR Normal rate, regular rhythm, no murmur, gallop, rub.   Abdomen: Soft, nontender, nondistended, normal bowel sounds  Neurologic: There are no new focal motor or sensory deficits, normal muscle tone and bulk, no abnormal sensation, normal speech, cranial nerves II through XII grossly intact  Skin: No gross lesions, rashes, bruising or bleeding on exposed skin area  Extremities:  peripheral pulses palpable, no pedal edema or calf pain with palpation  Psych: normal affect      Investigations:       Laboratory Testing:  Recent Results   No results found for this or any previous visit (from the past 24 hour(s)). Recent Labs     05/24/21 1932   HGB 12.2   HCT 36.5   WBC 5.2   MCV 86.7      K 4.3      CO2 27   BUN 18   CREATININE 0.67   GLUCOSE 120*   AST 17   ALT 15   LABALBU 4.6         No results for input(s): COVID19 in the last 720 hours. Imaging/Diagnostics:     MRI BREAST BILATERAL W WO CONTRAST     Result Date: 6/17/2021  EXAMINATION: MRI OF THE BILATERAL BREASTS WITHOUT AND WITH CONTRAST 6/17/2021 7:26 am TECHNIQUE: Multiplanar multisequence MRI of the bilateral breasts were performed without and with the administration of intravenous contrast. Data analysis was performed with color parametric mapping, image subtraction, and 3D reconstructions. COMPARISON: Mammogram dated 05/26/2021, 05/20/2021, 01/18/2019, 02/01/2016. Ultrasound dated 05/20/2021. HISTORY: ORDERING SYSTEM PROVIDED HISTORY: Invasive ductal carcinoma of breast, female, right [facility]) TECHNOLOGIST PROVIDED HISTORY: Reason for Exam: Invasive ductal carcinoma of breast, female, right Acuity: Unknown Type of Exam: Initial Biopsy on 05/26/2021. FINDINGS: Fibroglandular tissue: Scattered fibroglandular tissue. Background parenchymal enhancement: Minimal. Right breast: The biopsy-proven malignant mass demonstrates homogeneous enhancement in the 10 o'clock right breast, posterior third, approximately 8 cm from the nipple. The mass has spiculated margins and measures 1.3 x 1.5 x 1.4 cm on MRI. Rapid washout kinetics. Biopsy clip is seen within the mass. Fat plane between the mass and pectoralis muscle measures approximately 0.5 cm. There is no abnormal enhancement in the underlying pectoralis musculature. No abnormal skin thickening or enhancement.   No other suspicious mass or abnormal enhancement seen within the right breast. Left breast: No suspicious mass or abnormal enhancement. Lymph nodes: No axillary or internal mammary lymphadenopathy. Extra mammary: No additional findings. 1.  Right breast: Known 1.5 cm malignant mass in the posterior 10 o'clock right breast.  Fat plane between the mass and pectoralis muscle measures approximately 0.5 cm. No evidence of chest wall invasion. No evidence of multifocal disease. No lymphadenopathy. 2.  Left breast: No MR evidence of malignancy. Annual screening mammogram recommended. BI-RADS 6 BIRADS: BIRADS - CATEGORY 6 Known Biopsy-Proven Cancer. Appropriate action should be taken. OVERALL ASSESSMENT - KNOWN BIOPSY PROVEN MALIGNANCY. MAMMOGRAM POST BX CLIP PLACEMENT LEFT     Addendum Date: 6/1/2021    ADDENDUM: Radiology/pathology correlation: Pathology results from an ultrasound-guided biopsy performed on a mass in the 10 o'clock right breast demonstrates invasive ductal carcinoma, grade 2. ER negative/MN negative. The result is malignant and concordant. Surgical and oncologic consultation are recommended. BIRADS - CATEGORY 6 Known Biopsy-Proven Cancer. Appropriate action should be taken. OVERALL ASSESSMENT - KNOWN BIOPSY PROVEN MALIGNANCY. BI-RADS 6      Result Date: 6/1/2021  EXAMINATION: ULTRASOUND-GUIDED right BREAST BIOPSY WITH VACUUM-ASSIST ULTRASOUND-GUIDED CLIP PLACEMENT POSTPROCEDURE DIGITAL MAMMOGRAM 5/26/2021 HISTORY: ORDERING SYSTEM PROVIDED HISTORY: Status post breast biopsy COMPARISON: Mammogram and ultrasound dated 05/20/2021 TECHNIQUE: A timeout was performed to confirm patient identification and site of procedure. Risks, benefits, and alternatives of the procedure were discussed. Informed written consent was obtained. Transverse and longitudinal gray scale images were obtained of the hypoechoic mass in the 10 o'clock right breast, 8 cm from the nipple. The biopsy site was prepped in standard fashion.   Buffered 2% lidocaine was used for superficial local anesthesia. 2% lidocaine with epinephrine was used for deeper local anesthesia. A small skin incision was made. A 12-gauge, vacuum-assisted biopsy needle was advanced under sonographic guidance via a lateral approach. 6 cores were obtained and the needle was removed. A HydroMARKbiopsy clip was placed at the biopsy site under ultrasound guidance. Pressure was applied for hemostasis. The patient tolerated the procedure well with no immediate complications. POSTPROCEDURE MAMMOGRAM: ML and CC views of the right breast were obtained immediately following the procedure. The biopsy clip is in the correct location with respect to the targeted site. There is no evidence of biopsy clip migration from the biopsy site. Post biopsy clip placement imaging performed in a separate room. Technically successful ultrasound guided core biopsy of a hypoechoic mass in the 10 o'clock right breast and HydroMARKclip marker placement as described above. BIRADS: ZW - Pathology pending. US BREAST BIOPSY W LOC DEVICE 1ST LESION RIGHT     Addendum Date: 6/1/2021    ADDENDUM: Radiology/pathology correlation: Pathology results from an ultrasound-guided biopsy performed on a mass in the 10 o'clock right breast demonstrates invasive ductal carcinoma, grade 2. ER negative/W[place] negative. The result is malignant and concordant. Surgical and oncologic consultation are recommended. BIRADS - CATEGORY 6 Known Biopsy-Proven Cancer. Appropriate action should be taken. OVERALL ASSESSMENT - KNOWN BIOPSY PROVEN MALIGNANCY. BI-RADS 6      Result Date: 6/1/2021  EXAMINATION: ULTRASOUND-GUIDED right BREAST BIOPSY WITH VACUUM-ASSIST ULTRASOUND-GUIDED CLIP PLACEMENT POSTPROCEDURE DIGITAL MAMMOGRAM 5/26/2021 HISTORY: ORDERING SYSTEM PROVIDED HISTORY: Status post breast biopsy COMPARISON: Mammogram and ultrasound dated 05/20/2021 TECHNIQUE: A timeout was performed to confirm patient identification and site of procedure.  Risks, benefits, and alternatives of the procedure were discussed. Informed written consent was obtained. Transverse and longitudinal gray scale images were obtained of the hypoechoic mass in the 10 o'clock right breast, 8 cm from the nipple. The biopsy site was prepped in standard fashion. Buffered 2% lidocaine was used for superficial local anesthesia. 2% lidocaine with epinephrine was used for deeper local anesthesia. A small skin incision was made. A 12-gauge, vacuum-assisted biopsy needle was advanced under sonographic guidance via a lateral approach. 6 cores were obtained and the needle was removed. A HydroMARKbiopsy clip was placed at the biopsy site under ultrasound guidance. Pressure was applied for hemostasis. The patient tolerated the procedure well with no immediate complications. POSTPROCEDURE MAMMOGRAM: ML and CC views of the right breast were obtained immediately following the procedure. The biopsy clip is in the correct location with respect to the targeted site. There is no evidence of biopsy clip migration from the biopsy site. Post biopsy clip placement imaging performed in a separate room. Technically successful ultrasound guided core biopsy of a hypoechoic mass in the 10 o'clock right breast and HydroMARKclip marker placement as described above. BIRADS: ZW - Pathology pending. Diagnosis:       1. INVASIVE DUCTAL CARCINOMA GRADE  2  RIGHT BREAST      Plans:      1. RIGHT BREAST LUMPECTOMY WITH SENTINEL  NODE BIOPSY.         KAI Pearce CNP  6/21/2021  10:03 AM            Routing History

## 2021-06-28 LAB — SURGICAL PATHOLOGY REPORT: NORMAL

## 2021-06-29 ENCOUNTER — TELEPHONE (OUTPATIENT)
Dept: FAMILY MEDICINE CLINIC | Age: 71
End: 2021-06-29

## 2021-06-29 DIAGNOSIS — C50.911 INVASIVE DUCTAL CARCINOMA OF BREAST, FEMALE, RIGHT (HCC): Primary | ICD-10-CM

## 2021-06-29 DIAGNOSIS — C50.919 TRIPLE NEGATIVE MALIGNANT NEOPLASM OF BREAST (HCC): ICD-10-CM

## 2021-06-29 NOTE — TELEPHONE ENCOUNTER
Patient would like referral to [facility] for second opinion for triple negative breast cancer.  Pended

## 2021-06-30 ENCOUNTER — TELEPHONE (OUTPATIENT)
Dept: ONCOLOGY | Age: 71
End: 2021-06-30

## 2021-06-30 NOTE — TELEPHONE ENCOUNTER
Called Donya Tena to check in with her after her surgery. She relates she is doing well after the procedure. She relates she has met once with Dr. Karina Kong. Reminded her that I am here as a resource and support to her. Let her know that she can call me as needed. I can assist with coordinating care and appointments. I work primarily in the [place]. Referral noted in [facility] for [facility] consult for triple negative breast cancer. Fco Richard know that I am here if she should need assistance. I will wait for her to reach out since she has had surgery and is set up with Dr. Karina Kong at this time. Encouraged her to keep my name and number and call me as needed. I wished her well with her next steps. Signed off navigation. Available to sign on as needed in future.

## 2021-07-06 RX ORDER — ATENOLOL 25 MG/1
TABLET ORAL
Qty: 60 TABLET | Refills: 4 | Status: SHIPPED | OUTPATIENT
Start: 2021-07-06 | End: 2021-07-06 | Stop reason: SDUPTHER

## 2021-07-06 RX ORDER — ATENOLOL 25 MG/1
TABLET ORAL
Qty: 60 TABLET | Refills: 4 | Status: SHIPPED | OUTPATIENT
Start: 2021-07-06 | End: 2021-10-28 | Stop reason: SDUPTHER

## 2021-07-08 ENCOUNTER — HOSPITAL ENCOUNTER (OUTPATIENT)
Facility: MEDICAL CENTER | Age: 71
End: 2021-07-08
Payer: MEDICARE

## 2021-08-27 ENCOUNTER — OFFICE VISIT (OUTPATIENT)
Dept: FAMILY MEDICINE CLINIC | Age: 71
End: 2021-08-27
Payer: MEDICARE

## 2021-08-27 VITALS
DIASTOLIC BLOOD PRESSURE: 60 MMHG | TEMPERATURE: 98 F | WEIGHT: 127.6 LBS | OXYGEN SATURATION: 97 % | HEART RATE: 87 BPM | BODY MASS INDEX: 23.48 KG/M2 | HEIGHT: 62 IN | SYSTOLIC BLOOD PRESSURE: 120 MMHG

## 2021-08-27 DIAGNOSIS — K57.20 DIVERTICULITIS OF LARGE INTESTINE WITH PERFORATION WITHOUT BLEEDING: Primary | ICD-10-CM

## 2021-08-27 DIAGNOSIS — Z17.1 MALIGNANT NEOPLASM OF RIGHT BREAST IN FEMALE, ESTROGEN RECEPTOR NEGATIVE, UNSPECIFIED SITE OF BREAST (HCC): ICD-10-CM

## 2021-08-27 DIAGNOSIS — Z93.3 COLOSTOMY IN PLACE (HCC): ICD-10-CM

## 2021-08-27 DIAGNOSIS — C50.911 MALIGNANT NEOPLASM OF RIGHT BREAST IN FEMALE, ESTROGEN RECEPTOR NEGATIVE, UNSPECIFIED SITE OF BREAST (HCC): ICD-10-CM

## 2021-08-27 DIAGNOSIS — I10 ESSENTIAL HYPERTENSION: ICD-10-CM

## 2021-08-27 DIAGNOSIS — B37.31 YEAST VAGINITIS: ICD-10-CM

## 2021-08-27 DIAGNOSIS — Z09 HOSPITAL DISCHARGE FOLLOW-UP: ICD-10-CM

## 2021-08-27 PROBLEM — C50.919 BREAST CANCER (HCC): Status: ACTIVE | Noted: 2021-01-01

## 2021-08-27 PROCEDURE — 99214 OFFICE O/P EST MOD 30 MIN: CPT | Performed by: FAMILY MEDICINE

## 2021-08-27 PROCEDURE — 4040F PNEUMOC VAC/ADMIN/RCVD: CPT | Performed by: FAMILY MEDICINE

## 2021-08-27 PROCEDURE — 1123F ACP DISCUSS/DSCN MKR DOCD: CPT | Performed by: FAMILY MEDICINE

## 2021-08-27 PROCEDURE — G8420 CALC BMI NORM PARAMETERS: HCPCS | Performed by: FAMILY MEDICINE

## 2021-08-27 PROCEDURE — G8427 DOCREV CUR MEDS BY ELIG CLIN: HCPCS | Performed by: FAMILY MEDICINE

## 2021-08-27 PROCEDURE — 1090F PRES/ABSN URINE INCON ASSESS: CPT | Performed by: FAMILY MEDICINE

## 2021-08-27 PROCEDURE — 3017F COLORECTAL CA SCREEN DOC REV: CPT | Performed by: FAMILY MEDICINE

## 2021-08-27 PROCEDURE — G8400 PT W/DXA NO RESULTS DOC: HCPCS | Performed by: FAMILY MEDICINE

## 2021-08-27 PROCEDURE — 1111F DSCHRG MED/CURRENT MED MERGE: CPT | Performed by: FAMILY MEDICINE

## 2021-08-27 PROCEDURE — 1036F TOBACCO NON-USER: CPT | Performed by: FAMILY MEDICINE

## 2021-08-27 RX ORDER — CIPROFLOXACIN 500 MG/1
500 TABLET, FILM COATED ORAL 2 TIMES DAILY
COMMUNITY
Start: 2021-08-24 | End: 2021-08-28

## 2021-08-27 RX ORDER — ACETAMINOPHEN 325 MG/1
650 TABLET ORAL EVERY 6 HOURS PRN
COMMUNITY
End: 2021-10-28 | Stop reason: ALTCHOICE

## 2021-08-27 RX ORDER — OXYCODONE HYDROCHLORIDE AND ACETAMINOPHEN 5; 325 MG/1; MG/1
TABLET ORAL
COMMUNITY
Start: 2021-08-23 | End: 2021-10-28 | Stop reason: ALTCHOICE

## 2021-08-27 RX ORDER — FLUCONAZOLE 150 MG/1
150 TABLET ORAL
Qty: 2 TABLET | Refills: 1 | Status: SHIPPED | OUTPATIENT
Start: 2021-08-27 | End: 2021-10-28 | Stop reason: ALTCHOICE

## 2021-08-27 RX ORDER — ONDANSETRON 4 MG/1
4 TABLET, ORALLY DISINTEGRATING ORAL EVERY 8 HOURS PRN
COMMUNITY
Start: 2021-08-24 | End: 2021-10-28 | Stop reason: ALTCHOICE

## 2021-08-27 ASSESSMENT — ENCOUNTER SYMPTOMS
COUGH: 0
SHORTNESS OF BREATH: 0
ALLERGIC/IMMUNOLOGIC NEGATIVE: 1
ABDOMINAL PAIN: 1
EYES NEGATIVE: 1

## 2021-08-27 NOTE — PROGRESS NOTES
[facility]  [facility]  [address]  [address] [address]  Dept: [phone]    8/27/2021    CHIEF COMPLAINT    Chief Complaint   Patient presents with    Follow-Up from [address] [place] [address]    Jocelyn Bueno is a 66 y.o. female who presents   Chief Complaint   Patient presents with    Follow-Up from Hospital   .  Here with her  for hospital follow-up. Was admitted to [facility] on 8-13 with abdominal pain and cramping. This started after her second round of chemotherapy for breast cancer. She was diagnosed with diverticulitis which perforated. Needed sigmoid resection and colostomy which she expects to have reversed in 6 months to a year. She has home nurse coming in to help change the bag but she will need to learn how to do it in the future. Still taking antibiotic, Cipro for another week. Sx started after second dose of chemo for breast cancer. Had chemo at [address] [place] [address] in [facility]. Had a lumpectomy and 2 lymph nodes that were benign. Triple negative breast cancer, genetic mutation was negative. Plan was to have radiation after chemo was completed. She is still contemplating that she will undergo the radiation therapy but is not sure where she will go to have it done. Vitals:    08/27/21 1530   BP: 120/60   Pulse: 87   Temp: 98 °F (36.7 °C)   SpO2: 97%   Weight: 127 lb 9.6 oz (57.9 kg)   Height: 5' 2\" (1.575 m)       REVIEW OF SYSTEMS    Review of Systems   Constitutional: Positive for fatigue. Negative for fever and unexpected weight change. HENT: Negative. Eyes: Negative. Respiratory: Negative for cough and shortness of breath. Cardiovascular: Negative for chest pain and leg swelling. Gastrointestinal: Positive for abdominal pain. Colostomy bag in place. See HPI. Taking Percocet 1 or twice a day as needed for pain. Endocrine: Negative.     Genitourinary: Negative for frequency and urgency. Currently has symptoms of yeast vaginitis related to recent antibiotic treatment   Musculoskeletal: Negative. Skin: Negative. Allergic/Immunologic: Negative. Neurological: Negative for dizziness and headaches. Hematological: Negative. Psychiatric/Behavioral: Negative for sleep disturbance. The patient is not nervous/anxious. Denies any depression. She admits to feeling overwhelmed at times but feels that she has adequate support from her family. PAST MEDICAL HISTORY    Past Medical History:   Diagnosis Date    Breast cancer ([facility] [address] 2021    rt, triple negative    COVID-19 12/2020    Diverticulosis     Fatty liver 03/27/2019    Hyperlipidemia     Hypertension     Hypertriglyceridemia 03/27/2019    Irritable bowel syndrome     Rhinitis, allergic        FAMILY HISTORY    Family History   Problem Relation Age of Onset    High Blood Pressure Mother     Heart Failure Mother     High Blood Pressure Father     Stroke Father        SOCIAL HISTORY    Social History     Socioeconomic History    Marital status:      Spouse name: None    Number of children: 3    Years of education: None    Highest education level: None   Occupational History    None   Tobacco Use    Smoking status: Never Smoker    Smokeless tobacco: Never Used   Vaping Use    Vaping Use: Never used   Substance and Sexual Activity    Alcohol use: No    Drug use: No    Sexual activity: Yes     Partners: Male   Other Topics Concern    None   Social History Narrative    None     Social Determinants of Health     Financial Resource Strain: Low Risk     Difficulty of Paying Living Expenses: Not hard at all   Food Insecurity: No Food Insecurity    Worried About Running Out of Food in the Last Year: Never true    Sarah of Food in the Last Year: Never true   Transportation Needs: No Transportation Needs    Lack of Transportation (Medical): No    Lack of Transportation (Non-Medical):  No Physical Activity:     Days of Exercise per Week:     Minutes of Exercise per Session:    Stress:     Feeling of Stress :    Social Connections:     Frequency of Communication with Friends and Family:     Frequency of Social Gatherings with Friends and Family:     Attends Latter day Services:     Active Member of Clubs or Organizations:     Attends Club or Organization Meetings:     Marital Status:    Intimate Partner Violence:     Fear of Current or Ex-Partner:     Emotionally Abused:     Physically Abused:     Sexually Abused:        SURGICAL HISTORY    Past Surgical History:   Procedure Laterality Date    BREAST LUMPECTOMY Right 06/24/2021    RIGHT BREAST  LUMPECTOMY WITH SENTINEL NODE BIOPSY AND PECS2 BLOCK performed by Hudson Jaime MD at [facility]  08/2021    perforated signmoid colon from diverticulitis following chemo    CYST REMOVAL Left     breast    HERNIA REPAIR      TONSILLECTOMY      US BREAST NEEDLE BIOPSY RIGHT Right 05/26/2021    US BREAST NEEDLE BIOPSY RIGHT 5/26/2021 [facility]       CURRENT MEDICATIONS    Current Outpatient Medications   Medication Sig Dispense Refill    ciprofloxacin (CIPRO) 500 MG tablet Take 500 mg by mouth 2 times daily      ondansetron (ZOFRAN-ODT) 4 MG disintegrating tablet Take 4 mg by mouth every 8 hours as needed      oxyCODONE-acetaminophen (PERCOCET) 5-325 MG per tablet       acetaminophen (TYLENOL) 325 MG tablet Take 650 mg by mouth every 6 hours as needed for Pain      fluconazole (DIFLUCAN) 150 MG tablet Take 1 tablet by mouth every 72 hours as needed (vaginitis) 2 tablet 1    atenolol (TENORMIN) 25 MG tablet TAKE ONE TABLET BY MOUTH TWICE A DAY 60 tablet 4    hydroCHLOROthiazide (HYDRODIURIL) 25 MG tablet TAKE ONE TABLET BY MOUTH DAILY (Patient taking differently: Take 25 mg by mouth daily TAKE ONE TABLET BY MOUTH DAILY) 30 tablet 5    Multiple Vitamins-Minerals (MULTIVITAMIN ADULT PO) Take 1 tablet by mouth        No current facility-administered medications for this visit. ALLERGIES    Allergies   Allergen Reactions    Erythromycin     Sulfa Antibiotics Rash       PHYSICAL EXAM   Physical Exam  Constitutional:       Appearance: She is well-developed. HENT:      Head: Normocephalic. Eyes:      Conjunctiva/sclera: Conjunctivae normal.      Pupils: Pupils are equal, round, and reactive to light. Neck:      Thyroid: No thyromegaly. Cardiovascular:      Rate and Rhythm: Normal rate and regular rhythm. Heart sounds: Normal heart sounds. No murmur heard. Pulmonary:      Effort: Pulmonary effort is normal.      Breath sounds: Normal breath sounds. No wheezing or rales. Abdominal:      Palpations: Abdomen is soft. Tenderness: There is no abdominal tenderness. There is no guarding or rebound. Comments: Midline lower abdominal surgical incision with packing in place. Left lower abdomen colostomy bag in place. drainage tube in lower end of incision attached to suction. Musculoskeletal:         General: No tenderness or deformity. Normal range of motion. Cervical back: Normal range of motion and neck supple. Lymphadenopathy:      Cervical: No cervical adenopathy. Skin:     General: Skin is warm and dry. Neurological:      Mental Status: She is alert and oriented to person, place, and time. Psychiatric:         Mood and Affect: Mood normal.         Behavior: Behavior normal.         Thought Content: Thought content normal.         Judgment: Judgment normal.         ASSESSMENT/PLAN  1. Diverticulitis of large intestine with perforation without bleeding  Continue with home health care for colostomy management. Follow with surgeon as planned. Finish antibiotic  - ME DISCHARGE MEDS RECONCILED W/ CURRENT OUTPATIENT MED LIST    2. Hospital discharge follow-up  Reviewed hospital records with patient and .   Follow with colorectal surgeon and home health nurses    3. Malignant neoplasm of right breast in female, estrogen receptor negative, unspecified site of breast ([address]  Completed 2 rounds of chemo and doubt she will complete the rest due to gap in treatment. She will consider completing radiation but has not decided where she will have that done. She will follow with [facility] where her chemo was time. 4. Colostomy in place [facility])  Follow up home health nurse for the morning of colostomy bag change. 5. Yeast vaginitis    - fluconazole (DIFLUCAN) 150 MG tablet; Take 1 tablet by mouth every 72 hours as needed (vaginitis)  Dispense: 2 tablet; Refill: 1    6. Essential hypertension  Chronic and stable. Veronica Valverde received counseling on the following healthy behaviors: nutrition, exercise and medication adherence  Reviewed prior labs and health maintenance. Continue current medications, diet and exercise. Discussed use, benefit, and side effects of prescribed medications. Barriers to medication compliance addressed. Patient given educational materials - see patient instructions. All patient questions answered. Patient voiced understanding. Return in about 2 months (around 10/27/2021) for htn.         Electronically signed by Blanka Carrillo MD on 8/27/21 at 3:34 PM EDT

## 2021-09-23 ENCOUNTER — TELEPHONE (OUTPATIENT)
Dept: FAMILY MEDICINE CLINIC | Age: 71
End: 2021-09-23

## 2021-09-23 NOTE — TELEPHONE ENCOUNTER
Pt asking if she should use [facility] or [facility] for Radiation/Breast CA. After she gets a little better, just had another surgery as well. Asking for your opinion. Will discontinue with Chemo. Since, it is not working out so well for her.

## 2021-09-23 NOTE — TELEPHONE ENCOUNTER
Patient notified,expressed understanding,I asked was there anything else needed,she stated no pretty much just wanted doctors input

## 2021-09-23 NOTE — TELEPHONE ENCOUNTER
Please inform patient that the radiation treatment for breast cancer is pretty standard so she will be getting the same treatment at either location. It is usually a daily treatment for about 28 days so doing it in [place] would certainly be more convenient.

## 2021-10-11 ENCOUNTER — TELEPHONE (OUTPATIENT)
Dept: FAMILY MEDICINE CLINIC | Age: 71
End: 2021-10-11

## 2021-10-11 NOTE — TELEPHONE ENCOUNTER
Ne Gillespie was contacted as a part of [employer]. Patient will call PCP office another time to schedule.

## 2021-10-28 ENCOUNTER — OFFICE VISIT (OUTPATIENT)
Dept: FAMILY MEDICINE CLINIC | Age: 71
End: 2021-10-28
Payer: MEDICARE

## 2021-10-28 VITALS
WEIGHT: 136.4 LBS | TEMPERATURE: 97.9 F | BODY MASS INDEX: 25.1 KG/M2 | DIASTOLIC BLOOD PRESSURE: 60 MMHG | SYSTOLIC BLOOD PRESSURE: 128 MMHG | HEIGHT: 62 IN

## 2021-10-28 DIAGNOSIS — D64.9 ANEMIA, UNSPECIFIED TYPE: ICD-10-CM

## 2021-10-28 DIAGNOSIS — C50.911 MALIGNANT NEOPLASM OF RIGHT BREAST IN FEMALE, ESTROGEN RECEPTOR NEGATIVE, UNSPECIFIED SITE OF BREAST (HCC): ICD-10-CM

## 2021-10-28 DIAGNOSIS — Z78.0 MENOPAUSE: ICD-10-CM

## 2021-10-28 DIAGNOSIS — I10 ESSENTIAL HYPERTENSION: Primary | ICD-10-CM

## 2021-10-28 DIAGNOSIS — E61.1 IRON DEFICIENCY: ICD-10-CM

## 2021-10-28 DIAGNOSIS — Z93.3 COLOSTOMY IN PLACE (HCC): ICD-10-CM

## 2021-10-28 DIAGNOSIS — Z17.1 MALIGNANT NEOPLASM OF RIGHT BREAST IN FEMALE, ESTROGEN RECEPTOR NEGATIVE, UNSPECIFIED SITE OF BREAST (HCC): ICD-10-CM

## 2021-10-28 DIAGNOSIS — K90.89 OTHER SPECIFIED INTESTINAL MALABSORPTION: ICD-10-CM

## 2021-10-28 PROCEDURE — 1123F ACP DISCUSS/DSCN MKR DOCD: CPT | Performed by: FAMILY MEDICINE

## 2021-10-28 PROCEDURE — G8427 DOCREV CUR MEDS BY ELIG CLIN: HCPCS | Performed by: FAMILY MEDICINE

## 2021-10-28 PROCEDURE — G8484 FLU IMMUNIZE NO ADMIN: HCPCS | Performed by: FAMILY MEDICINE

## 2021-10-28 PROCEDURE — 99214 OFFICE O/P EST MOD 30 MIN: CPT | Performed by: FAMILY MEDICINE

## 2021-10-28 PROCEDURE — G8400 PT W/DXA NO RESULTS DOC: HCPCS | Performed by: FAMILY MEDICINE

## 2021-10-28 PROCEDURE — G8420 CALC BMI NORM PARAMETERS: HCPCS | Performed by: FAMILY MEDICINE

## 2021-10-28 PROCEDURE — 1036F TOBACCO NON-USER: CPT | Performed by: FAMILY MEDICINE

## 2021-10-28 PROCEDURE — 1090F PRES/ABSN URINE INCON ASSESS: CPT | Performed by: FAMILY MEDICINE

## 2021-10-28 PROCEDURE — 3017F COLORECTAL CA SCREEN DOC REV: CPT | Performed by: FAMILY MEDICINE

## 2021-10-28 PROCEDURE — 4040F PNEUMOC VAC/ADMIN/RCVD: CPT | Performed by: FAMILY MEDICINE

## 2021-10-28 RX ORDER — ATENOLOL 25 MG/1
25 TABLET ORAL DAILY
Qty: 90 TABLET | Refills: 3
Start: 2021-10-28 | End: 2022-04-28

## 2021-10-28 ASSESSMENT — ENCOUNTER SYMPTOMS
EYES NEGATIVE: 1
SHORTNESS OF BREATH: 0
ALLERGIC/IMMUNOLOGIC NEGATIVE: 1
COUGH: 0
ABDOMINAL PAIN: 0

## 2021-10-28 NOTE — PROGRESS NOTES
[facility]  [facility]  [address]  [facility] [address]  Dept: [phone]    10/28/2021    CHIEF COMPLAINT    Chief Complaint   Patient presents with    Hypertension       HPI    Andra Dejesus is a 66 y.o. female who presents   Chief Complaint   Patient presents with    Hypertension   . Recheck hypertension. Taking medication at a reduced dose because of low blood pressure readings. She is currently undergoing radiation therapy for breast cancer. Plans to have a lumpectomy in the future. She did have chemotherapy with a side effect of perforated diverticulum. Currently has a colostomy which is functioning well. Plans to have a reversal in the future but is not sure when that will be accomplished. She is coping well with her diagnoses. Vitals:    10/28/21 1112   BP: 128/60   Temp: 97.9 °F (36.6 °C)   Weight: 136 lb 6.4 oz (61.9 kg)   Height: 5' 2\" (1.575 m)       REVIEW OF SYSTEMS    Review of Systems   Constitutional: Negative for fatigue, fever and unexpected weight change. HENT: Negative. Eyes: Negative. Respiratory: Negative for cough and shortness of breath. Cardiovascular: Negative for chest pain and leg swelling. Gastrointestinal: Negative for abdominal pain. Colostomy   Endocrine: Negative. Genitourinary: Negative for frequency and urgency. Musculoskeletal: Negative. Skin: Positive for wound. Going to wound management for incisional wound which is slow to heal   Allergic/Immunologic: Negative. Neurological: Negative for dizziness and headaches. Hematological: Negative. Psychiatric/Behavioral: Negative for sleep disturbance. The patient is not nervous/anxious.         PAST MEDICAL HISTORY    Past Medical History:   Diagnosis Date    Breast cancer ([address] 2021    rt, triple negative    COVID-19 12/2020    Diverticulosis     Fatty liver 03/27/2019    Hyperlipidemia     Hypertension     Hypertriglyceridemia 03/27/2019    Irritable bowel syndrome     Rhinitis, allergic        FAMILY HISTORY    Family History   Problem Relation Age of Onset    High Blood Pressure Mother     Heart Failure Mother     High Blood Pressure Father     Stroke Father        SOCIAL HISTORY    Social History     Socioeconomic History    Marital status:      Spouse name: None    Number of children: 3    Years of education: None    Highest education level: None   Occupational History    None   Tobacco Use    Smoking status: Never Smoker    Smokeless tobacco: Never Used   Vaping Use    Vaping Use: Never used   Substance and Sexual Activity    Alcohol use: No    Drug use: No    Sexual activity: Yes     Partners: Male   Other Topics Concern    None   Social History Narrative    None     Social Determinants of Health     Financial Resource Strain: Low Risk     Difficulty of Paying Living Expenses: Not hard at all   Food Insecurity: No Food Insecurity    Worried About Running Out of Food in the Last Year: Never true    Sarah of Food in the Last Year: Never true   Transportation Needs: No Transportation Needs    Lack of Transportation (Medical): No    Lack of Transportation (Non-Medical):  No   Physical Activity:     Days of Exercise per Week:     Minutes of Exercise per Session:    Stress:     Feeling of Stress :    Social Connections:     Frequency of Communication with Friends and Family:     Frequency of Social Gatherings with Friends and Family:     Attends Orthodox Services:     Active Member of Clubs or Organizations:     Attends Club or Organization Meetings:     Marital Status:    Intimate Partner Violence:     Fear of Current or Ex-Partner:     Emotionally Abused:     Physically Abused:     Sexually Abused:        SURGICAL HISTORY    Past Surgical History:   Procedure Laterality Date    BREAST LUMPECTOMY Right 06/24/2021    RIGHT BREAST  LUMPECTOMY WITH SENTINEL NODE BIOPSY AND PECS2 BLOCK performed by Alida Box MD at [facility]  08/2021    perforated signmoid colon from diverticulitis following chemo    CYST REMOVAL Left     breast    HERNIA REPAIR      TONSILLECTOMY      US BREAST NEEDLE BIOPSY RIGHT Right 05/26/2021    US BREAST NEEDLE BIOPSY RIGHT 5/26/2021 [facility]       CURRENT MEDICATIONS    Current Outpatient Medications   Medication Sig Dispense Refill    Lysine 500 MG CAPS Take by mouth      atenolol (TENORMIN) 25 MG tablet Take 1 tablet by mouth daily 90 tablet 3    hydroCHLOROthiazide (HYDRODIURIL) 25 MG tablet TAKE ONE TABLET BY MOUTH DAILY (Patient taking differently: Take 25 mg by mouth daily TAKE ONE TABLET BY MOUTH DAILY) 30 tablet 5     No current facility-administered medications for this visit. ALLERGIES    Allergies   Allergen Reactions    Erythromycin     Sulfa Antibiotics Rash       PHYSICAL EXAM   Physical Exam  Vitals reviewed. Constitutional:       Appearance: She is well-developed. HENT:      Head: Normocephalic. Eyes:      Conjunctiva/sclera: Conjunctivae normal.      Pupils: Pupils are equal, round, and reactive to light. Neck:      Thyroid: No thyromegaly. Cardiovascular:      Rate and Rhythm: Normal rate and regular rhythm. Heart sounds: Normal heart sounds. No murmur heard. Pulmonary:      Effort: Pulmonary effort is normal.      Breath sounds: Normal breath sounds. No wheezing or rales. Abdominal:      Palpations: Abdomen is soft. Tenderness: There is no abdominal tenderness. There is no guarding or rebound. Musculoskeletal:         General: No tenderness or deformity. Normal range of motion. Cervical back: Normal range of motion and neck supple. Lymphadenopathy:      Cervical: No cervical adenopathy. Skin:     General: Skin is warm and dry.    Neurological:      Mental Status: She is alert and oriented to person, place, and time. Psychiatric:         Mood and Affect: Mood normal.         Behavior: Behavior normal.         Thought Content: Thought content normal.         Judgment: Judgment normal.         ASSESSMENT/PLAN  1. Essential hypertension  Chronic and stable. Continue lower dose of Tenormin  - atenolol (TENORMIN) 25 MG tablet; Take 1 tablet by mouth daily  Dispense: 90 tablet; Refill: 3  - Vitamin D 25 Hydroxy; Future    2. Malignant neoplasm of right breast in female, estrogen receptor negative, unspecified site of breast [facility])  Continue radiation therapy and following with oncologist and surgeon  - Vitamin B12; Future  - CBC Auto Differential; Future    3. Colostomy in place [facility])  Continue maintenance of colostomy and follow-up with surgeon    4. Iron deficiency  Check levels and if indicated start iron supplement  - Iron; Future  - Ferritin; Future  - CBC Auto Differential; Future    5. Anemia, unspecified type  As above  - Vitamin B12; Future  - Vitamin D 25 Hydroxy; Future    6. Menopause    - Vitamin D 25 Hydroxy; Future    7. Other specified intestinal malabsorption     - Vitamin D 25 Hydroxy; Future       Bereket Blush received counseling on the following healthy behaviors: nutrition, exercise and medication adherence  Reviewed prior labs and health maintenance. Continue current medications, diet and exercise. Discussed use, benefit, and side effects of prescribed medications. Barriers to medication compliance addressed. Patient given educational materials - see patient instructions. All patient questions answered. Patient voiced understanding. Return in about 6 months (around 4/28/2022) for htn.         Electronically signed by Shahrzad Hammer MD on 10/28/21 at 11:21 AM EDT

## 2021-10-30 DIAGNOSIS — I10 ESSENTIAL HYPERTENSION: ICD-10-CM

## 2021-10-30 RX ORDER — HYDROCHLOROTHIAZIDE 25 MG/1
TABLET ORAL
Qty: 30 TABLET | Refills: 5 | Status: SHIPPED | OUTPATIENT
Start: 2021-10-30 | End: 2022-04-28

## 2021-11-03 ENCOUNTER — TELEMEDICINE (OUTPATIENT)
Dept: FAMILY MEDICINE CLINIC | Age: 71
End: 2021-11-03
Payer: MEDICARE

## 2021-11-03 DIAGNOSIS — R42 VERTIGO: Primary | ICD-10-CM

## 2021-11-03 PROCEDURE — 4040F PNEUMOC VAC/ADMIN/RCVD: CPT | Performed by: FAMILY MEDICINE

## 2021-11-03 PROCEDURE — 99213 OFFICE O/P EST LOW 20 MIN: CPT | Performed by: FAMILY MEDICINE

## 2021-11-03 PROCEDURE — 1123F ACP DISCUSS/DSCN MKR DOCD: CPT | Performed by: FAMILY MEDICINE

## 2021-11-03 PROCEDURE — G8400 PT W/DXA NO RESULTS DOC: HCPCS | Performed by: FAMILY MEDICINE

## 2021-11-03 PROCEDURE — 3017F COLORECTAL CA SCREEN DOC REV: CPT | Performed by: FAMILY MEDICINE

## 2021-11-03 PROCEDURE — 1090F PRES/ABSN URINE INCON ASSESS: CPT | Performed by: FAMILY MEDICINE

## 2021-11-03 PROCEDURE — G8427 DOCREV CUR MEDS BY ELIG CLIN: HCPCS | Performed by: FAMILY MEDICINE

## 2021-11-03 RX ORDER — MECLIZINE HYDROCHLORIDE 25 MG/1
25 TABLET ORAL 3 TIMES DAILY PRN
Qty: 30 TABLET | Refills: 1 | Status: SHIPPED | OUTPATIENT
Start: 2021-11-03 | End: 2021-11-13

## 2021-11-03 RX ORDER — OMEPRAZOLE 40 MG/1
CAPSULE, DELAYED RELEASE ORAL
COMMUNITY
Start: 2021-11-02 | End: 2022-01-12 | Stop reason: ALTCHOICE

## 2021-11-03 NOTE — PROGRESS NOTES
[facility]  [facility]  [address]  [facility] [address]  Dept: [phone]    11/3/2021    TELEHEALTH EVALUATION -- Audio/Visual (During ZRYLY-59 public health emergency)  General Miller (:  1950) has requested an audio/video evaluation for the following concern(s):    HPI:  Video visit to discuss new onset of dizziness this morning when she got up. Had a few episodes of dizziness with head movement at home and then after getting up from radiation therapy for breast cancer. Occurs only with tilting head to the right. There were no vitals filed for this visit. REVIEW OF SYSTEMS:   Review of Systems   Cardiovascular: Negative for chest pain. Neurological: Positive for dizziness and light-headedness. Negative for weakness and numbness.        PAST MEDICAL HISTORY:    Past Medical History:   Diagnosis Date    Breast cancer ([address]     rt, triple negative    COVID-19 2020    Diverticulosis     Fatty liver 2019    Hyperlipidemia     Hypertension     Hypertriglyceridemia 2019    Irritable bowel syndrome     Rhinitis, allergic        FAMILY HISTORY:    Family History   Problem Relation Age of Onset    High Blood Pressure Mother     Heart Failure Mother     High Blood Pressure Father     Stroke Father        SOCIAL HISTORY:    Social History     Socioeconomic History    Marital status:      Spouse name: Not on file    Number of children: 3    Years of education: Not on file    Highest education level: Not on file   Occupational History    Not on file   Tobacco Use    Smoking status: Never Smoker    Smokeless tobacco: Never Used   Vaping Use    Vaping Use: Never used   Substance and Sexual Activity    Alcohol use: No    Drug use: No    Sexual activity: Yes     Partners: Male   Other Topics Concern    Not on file   Social History Narrative    Not on file     Social Determinants of Health Financial Resource Strain: Low Risk     Difficulty of Paying Living Expenses: Not hard at all   Food Insecurity: No Food Insecurity    Worried About Running Out of Food in the Last Year: Never true    Sarah of Food in the Last Year: Never true   Transportation Needs: No Transportation Needs    Lack of Transportation (Medical): No    Lack of Transportation (Non-Medical):  No   Physical Activity:     Days of Exercise per Week:     Minutes of Exercise per Session:    Stress:     Feeling of Stress :    Social Connections:     Frequency of Communication with Friends and Family:     Frequency of Social Gatherings with Friends and Family:     Attends Faith Services:     Active Member of Clubs or Organizations:     Attends Club or Organization Meetings:     Marital Status:    Intimate Partner Violence:     Fear of Current or Ex-Partner:     Emotionally Abused:     Physically Abused:     Sexually Abused:        SURGICAL HISTORY:    Past Surgical History:   Procedure Laterality Date    BREAST LUMPECTOMY Right 06/24/2021    RIGHT BREAST  LUMPECTOMY WITH SENTINEL NODE BIOPSY AND PECS2 BLOCK performed by Pastor Crook MD at [facility]  08/2021    perforated signmoid colon from diverticulitis following chemo    CYST REMOVAL Left     breast    HERNIA REPAIR      TONSILLECTOMY      US BREAST NEEDLE BIOPSY RIGHT Right 05/26/2021    US BREAST NEEDLE BIOPSY RIGHT 5/26/2021 [facility]       CURRENT MEDICATIONS:    Current Outpatient Medications   Medication Sig Dispense Refill    Cholecalciferol 50 MCG (2000 UT) TABS Take 2,000 Units by mouth daily      omeprazole (PRILOSEC) 40 MG delayed release capsule       meclizine (ANTIVERT) 25 MG tablet Take 1 tablet by mouth 3 times daily as needed for Dizziness 30 tablet 1    hydroCHLOROthiazide (HYDRODIURIL) 25 MG tablet TAKE ONE TABLET BY MOUTH DAILY 30 tablet 5    Lysine 500 MG CAPS Take by mouth      atenolol (TENORMIN) 25 MG tablet Take 1 tablet by mouth daily 90 tablet 3     No current facility-administered medications for this visit. ALLERGIES:   Allergies   Allergen Reactions    Erythromycin     Sulfa Antibiotics Rash       PHYSICAL EXAM:   Physical Exam  Vitals reviewed. Constitutional:       Appearance: Normal appearance. HENT:      Head: Normocephalic. Eyes:      Extraocular Movements: Extraocular movements intact. Conjunctiva/sclera: Conjunctivae normal.   Pulmonary:      Effort: Pulmonary effort is normal.   Musculoskeletal:         General: Normal range of motion. Cervical back: Normal range of motion. Skin:     Findings: No rash. Neurological:      General: No focal deficit present. Mental Status: She is alert and oriented to person, place, and time. Mental status is at baseline. Psychiatric:         Mood and Affect: Mood normal.         Behavior: Behavior normal.         Thought Content: Thought content normal.         Judgment: Judgment normal.          ASSESSMENT/PLAN:  1. Vertigo  Increase hydration, move slowly, take med if needed. Refer to PT if worse or not improving.   - meclizine (ANTIVERT) 25 MG tablet; Take 1 tablet by mouth 3 times daily as needed for Dizziness  Dispense: 30 tablet; Refill: 1      No follow-ups on file. Terell Orourke is a 66 y.o. female being evaluated by a Virtual Visit (video visit) encounter to address concerns as mentioned above. A caregiver was present when appropriate. Due to this being a TeleHealth encounter (During [facility]- public health emergency), evaluation of the following organ systems was limited: Vitals/Constitutional/EENT/Resp/CV/GI//MS/Neuro/Skin/Heme-Lymph-Imm.   Pursuant to the emergency declaration under the [facility] authority and the [employer] Act, this Virtual Visit was conducted with patient's (and/or legal guardian's) consent, to reduce the patient's risk of exposure to COVID-19 and provide necessary medical care. The patient (and/or legal guardian) has also been advised to contact this office for worsening conditions or problems, and seek emergency medical treatment and/or call 911 if deemed necessary. Services were provided through a video synchronous discussion virtually to substitute for in-person clinic visit. Patient and provider were located at their individual homes. --Rubi Edwards MD on 11/3/2021 at 10:26 AM    An electronic signature was used to authenticate this note.

## 2021-11-04 ENCOUNTER — HOSPITAL ENCOUNTER (OUTPATIENT)
Age: 71
Discharge: HOME | End: 2021-11-04
Payer: MEDICARE

## 2021-11-04 DIAGNOSIS — Z17.1 MALIGNANT NEOPLASM OF RIGHT BREAST IN FEMALE, ESTROGEN RECEPTOR NEGATIVE, UNSPECIFIED SITE OF BREAST (HCC): ICD-10-CM

## 2021-11-04 DIAGNOSIS — C50.911 MALIGNANT NEOPLASM OF RIGHT BREAST IN FEMALE, ESTROGEN RECEPTOR NEGATIVE, UNSPECIFIED SITE OF BREAST (HCC): ICD-10-CM

## 2021-11-04 DIAGNOSIS — K90.89 OTHER SPECIFIED INTESTINAL MALABSORPTION: ICD-10-CM

## 2021-11-04 DIAGNOSIS — Z78.0 MENOPAUSE: ICD-10-CM

## 2021-11-04 DIAGNOSIS — E61.1 IRON DEFICIENCY: ICD-10-CM

## 2021-11-04 DIAGNOSIS — I10 ESSENTIAL HYPERTENSION: ICD-10-CM

## 2021-11-04 DIAGNOSIS — D64.9 ANEMIA, UNSPECIFIED TYPE: ICD-10-CM

## 2021-11-04 LAB
ABSOLUTE EOS #: 0.27 K/UL (ref 0–0.44)
ABSOLUTE IMMATURE GRANULOCYTE: <0.03 K/UL (ref 0–0.3)
ABSOLUTE LYMPH #: 1.66 K/UL (ref 1.1–3.7)
ABSOLUTE MONO #: 0.57 K/UL (ref 0.1–1.2)
BASOPHILS # BLD: 1 % (ref 0–2)
BASOPHILS ABSOLUTE: 0.04 K/UL (ref 0–0.2)
DIFFERENTIAL TYPE: ABNORMAL
EOSINOPHILS RELATIVE PERCENT: 4 % (ref 1–4)
HCT VFR BLD CALC: 36.6 % (ref 36.3–47.1)
HEMOGLOBIN: 11.9 G/DL (ref 11.9–15.1)
IMMATURE GRANULOCYTES: 0 %
LYMPHOCYTES # BLD: 23 % (ref 24–43)
MCH RBC QN AUTO: 28.3 PG (ref 25.2–33.5)
MCHC RBC AUTO-ENTMCNC: 32.5 G/DL (ref 28.4–34.8)
MCV RBC AUTO: 86.9 FL (ref 82.6–102.9)
MONOCYTES # BLD: 8 % (ref 3–12)
NRBC AUTOMATED: 0 PER 100 WBC
PDW BLD-RTO: 12.8 % (ref 11.8–14.4)
PLATELET # BLD: 307 K/UL (ref 138–453)
PLATELET ESTIMATE: ABNORMAL
PMV BLD AUTO: 10.6 FL (ref 8.1–13.5)
RBC # BLD: 4.21 M/UL (ref 3.95–5.11)
RBC # BLD: ABNORMAL 10*6/UL
SEG NEUTROPHILS: 64 % (ref 36–65)
SEGMENTED NEUTROPHILS ABSOLUTE COUNT: 4.72 K/UL (ref 1.5–8.1)
VITAMIN B-12: 408 PG/ML (ref 232–1245)
WBC # BLD: 7.3 K/UL (ref 3.5–11.3)
WBC # BLD: ABNORMAL 10*3/UL

## 2021-11-04 PROCEDURE — 82607 VITAMIN B-12: CPT

## 2021-11-04 PROCEDURE — 82728 ASSAY OF FERRITIN: CPT

## 2021-11-04 PROCEDURE — 36415 COLL VENOUS BLD VENIPUNCTURE: CPT

## 2021-11-04 PROCEDURE — 83540 ASSAY OF IRON: CPT

## 2021-11-04 PROCEDURE — 85025 COMPLETE CBC W/AUTO DIFF WBC: CPT

## 2021-11-04 PROCEDURE — 82306 VITAMIN D 25 HYDROXY: CPT

## 2021-11-05 LAB
FERRITIN: 330 UG/L (ref 13–150)
IRON: 42 UG/DL (ref 37–145)
VITAMIN D 25-HYDROXY: 34.9 NG/ML (ref 30–100)

## 2022-01-12 ENCOUNTER — TELEPHONE (OUTPATIENT)
Dept: FAMILY MEDICINE CLINIC | Age: 72
End: 2022-01-12

## 2022-01-12 ENCOUNTER — OFFICE VISIT (OUTPATIENT)
Dept: FAMILY MEDICINE CLINIC | Age: 72
End: 2022-01-12
Payer: MEDICARE

## 2022-01-12 VITALS
HEART RATE: 88 BPM | WEIGHT: 132 LBS | OXYGEN SATURATION: 98 % | SYSTOLIC BLOOD PRESSURE: 130 MMHG | DIASTOLIC BLOOD PRESSURE: 60 MMHG | BODY MASS INDEX: 24.14 KG/M2

## 2022-01-12 DIAGNOSIS — J34.89 SORE IN NOSE: Primary | ICD-10-CM

## 2022-01-12 DIAGNOSIS — Z93.3 COLOSTOMY IN PLACE (HCC): ICD-10-CM

## 2022-01-12 DIAGNOSIS — C50.911 MALIGNANT NEOPLASM OF RIGHT BREAST IN FEMALE, ESTROGEN RECEPTOR NEGATIVE, UNSPECIFIED SITE OF BREAST (HCC): ICD-10-CM

## 2022-01-12 DIAGNOSIS — Z17.1 MALIGNANT NEOPLASM OF RIGHT BREAST IN FEMALE, ESTROGEN RECEPTOR NEGATIVE, UNSPECIFIED SITE OF BREAST (HCC): ICD-10-CM

## 2022-01-12 PROBLEM — K57.90 DIVERTICULOSIS: Status: ACTIVE | Noted: 2021-10-18

## 2022-01-12 PROBLEM — K57.20 DIVERTICULITIS OF LARGE INTESTINE WITH PERFORATION WITHOUT ABSCESS OR BLEEDING: Status: ACTIVE | Noted: 2021-08-15

## 2022-01-12 PROCEDURE — 3017F COLORECTAL CA SCREEN DOC REV: CPT | Performed by: NURSE PRACTITIONER

## 2022-01-12 PROCEDURE — 1036F TOBACCO NON-USER: CPT | Performed by: NURSE PRACTITIONER

## 2022-01-12 PROCEDURE — G8484 FLU IMMUNIZE NO ADMIN: HCPCS | Performed by: NURSE PRACTITIONER

## 2022-01-12 PROCEDURE — G8420 CALC BMI NORM PARAMETERS: HCPCS | Performed by: NURSE PRACTITIONER

## 2022-01-12 PROCEDURE — G8427 DOCREV CUR MEDS BY ELIG CLIN: HCPCS | Performed by: NURSE PRACTITIONER

## 2022-01-12 PROCEDURE — 1090F PRES/ABSN URINE INCON ASSESS: CPT | Performed by: NURSE PRACTITIONER

## 2022-01-12 PROCEDURE — G8400 PT W/DXA NO RESULTS DOC: HCPCS | Performed by: NURSE PRACTITIONER

## 2022-01-12 PROCEDURE — 99214 OFFICE O/P EST MOD 30 MIN: CPT | Performed by: NURSE PRACTITIONER

## 2022-01-12 PROCEDURE — 1123F ACP DISCUSS/DSCN MKR DOCD: CPT | Performed by: NURSE PRACTITIONER

## 2022-01-12 PROCEDURE — 4040F PNEUMOC VAC/ADMIN/RCVD: CPT | Performed by: NURSE PRACTITIONER

## 2022-01-12 RX ORDER — GLUC/MSM/COLGN2/HYAL/ANTIARTH3 375-375-20
TABLET ORAL
COMMUNITY
End: 2022-01-12 | Stop reason: ALTCHOICE

## 2022-01-12 RX ORDER — MOMETASONE FUROATE 1 MG/G
OINTMENT TOPICAL
COMMUNITY
Start: 2021-10-21

## 2022-01-12 RX ORDER — MULTIVIT WITH MINERALS/LUTEIN
250 TABLET ORAL DAILY
COMMUNITY

## 2022-01-12 RX ORDER — ZINC GLUCONATE 50 MG
50 TABLET ORAL DAILY
COMMUNITY

## 2022-01-12 NOTE — PROGRESS NOTES
[address] [place] [address] [facility] [address]  Dept: [phone]    1/12/2022    CHIEF COMPLAINT    Chief Complaint   Patient presents with    Other     nose pain       HPI    Kiersten Hahn is a 66 y.o. female who presents   Chief Complaint   Patient presents with    Other     nose pain   . Appointment to discuss left nare tenderness and sore. Left nare- she notes that this has been bothering her off and on for the last 8-9 days. She has Bactroban at home, but she did not use it. She has seen a white head type appearance and a scab at times. She initially thought it was a cold sore, but she has never had one in her nose. She had tried Abreva OTCx 1 treatment, but it didn't seem to help. Recent follow-up with general surgery. History of ex lap sigmoidectomy with end colostomy (Castillo's procedure on 8/15/2021. This was performed for perforated sigmoid diverticulitis. Patient will be following up with Dr. Lisa Colney in 6 weeks to discuss possible takedown of her colostomy. Stoma is functioning. Roughly 2 and half weeks ago she did fall on her stomach on her driveway and has noted pain below her ostomy site. She discussed this with general surgery KRYS Hope on 1/4/2022. Her assessment of the patient's abdomen and stoma were within normal limits. No parastomal hernia noted. She did see the cancer centers [facility] and an MRI and [facility] were completed. This testing showed a benign kidney cyst.     Breast cancer-she has completed her radiation. She has never restarted chemotherapy since her colon surgery. Last treatment was in 8/2021. She is considered cancer free at this time. She will return to [address] [place] [address] in March. They will do a mammogram and labs.          Vitals:    01/12/22 0958   BP: 130/60   Pulse: 88   SpO2: 98%   Weight: 132 lb (59.9 kg)       Wt Readings from Last 3 Encounters:   01/12/22 132 lb (59.9 kg)   10/28/21 136 lb 6.4 oz (61.9 kg)   08/27/21 127 lb 9.6 oz (57.9 kg)     BP Readings from Last 3 Encounters:   01/12/22 130/60   10/28/21 128/60   08/27/21 120/60       REVIEW OF SYSTEMS    Review of Systems   Constitutional: Negative. Negative for chills, fatigue and fever. HENT:        Left nare wound    Eyes: Negative for visual disturbance (wearing glasses ). Cardiovascular: Negative. Negative for chest pain and palpitations. Genitourinary: Negative. Musculoskeletal: Negative. Negative for myalgias. PAST MEDICAL HISTORY    Past Medical History:   Diagnosis Date    Breast cancer ([facility] [address] 2021    rt, triple negative    COVID-19 12/2020    Diverticulosis     Fatty liver 03/27/2019    Hyperlipidemia     Hypertension     Hypertriglyceridemia 03/27/2019    Irritable bowel syndrome     Rhinitis, allergic        FAMILY HISTORY    Family History   Problem Relation Age of Onset    High Blood Pressure Mother     Heart Failure Mother     High Blood Pressure Father     Stroke Father        SOCIAL HISTORY    Social History     Socioeconomic History    Marital status:      Spouse name: None    Number of children: 3    Years of education: None    Highest education level: None   Occupational History    None   Tobacco Use    Smoking status: Never Smoker    Smokeless tobacco: Never Used   Vaping Use    Vaping Use: Never used   Substance and Sexual Activity    Alcohol use: No    Drug use: No    Sexual activity: Yes     Partners: Male   Other Topics Concern    None   Social History Narrative    None     Social Determinants of Health     Financial Resource Strain: Low Risk     Difficulty of Paying Living Expenses: Not hard at all   Food Insecurity: No Food Insecurity    Worried About Running Out of Food in the Last Year: Never true    Sarah of Food in the Last Year: Never true   Transportation Needs: No Transportation Needs    Lack of Transportation (Medical):  No    Lack of Transportation (Non-Medical):  No   Physical Activity:     Days of Exercise per Week: Not on file    Minutes of Exercise per Session: Not on file   Stress:     Feeling of Stress : Not on file   Social Connections:     Frequency of Communication with Friends and Family: Not on file    Frequency of Social Gatherings with Friends and Family: Not on file    Attends Anglican Services: Not on file    Active Member of [address] [place] [address] or Organizations: Not on file    Attends Club or Organization Meetings: Not on file    Marital Status: Not on file   Intimate Partner Violence:     Fear of Current or Ex-Partner: Not on file    Emotionally Abused: Not on file    Physically Abused: Not on file    Sexually Abused: Not on file   Housing Stability:     Unable to Pay for Housing in the Last Year: Not on file    Number of [place] in the Last Year: Not on file    Unstable Housing in the Last Year: Not on file       SURGICAL HISTORY    Past Surgical History:   Procedure Laterality Date    BREAST LUMPECTOMY Right 06/24/2021    RIGHT BREAST  LUMPECTOMY WITH SENTINEL NODE BIOPSY AND PECS2 BLOCK performed by Maria Fernanda Bruno MD at [facility]  08/2021    perforated signmoid colon from diverticulitis following chemo    CYST REMOVAL Left     breast    HERNIA REPAIR      TONSILLECTOMY      US BREAST NEEDLE BIOPSY RIGHT Right 05/26/2021    US BREAST NEEDLE BIOPSY RIGHT 5/26/2021 [facility]       CURRENT MEDICATIONS    Current Outpatient Medications   Medication Sig Dispense Refill    zinc gluconate 50 MG tablet Take 50 mg by mouth daily      Ascorbic Acid (VITAMIN C) 250 MG tablet Take 250 mg by mouth daily      BIOTIN PO Take by mouth      Lactobacillus-Inulin (CULTURELLE DIGESTIVE DAILY PO) Take by mouth      Cholecalciferol 50 MCG (2000 UT) TABS Take 2,000 Units by mouth daily      hydroCHLOROthiazide (HYDRODIURIL) 25 MG tablet TAKE ONE reversal  3. Malignant neoplasm of right breast in female, estrogen receptor negative, unspecified site of breast [facility])  Assessment & Plan:   Monitored by specialist- no acute findings meriting change in the plan. [facility] Bg Valenzuela received counseling on the following healthy behaviors: nutrition, exercise and medication adherence  Reviewed prior labs and health maintenance. Continue current medications, diet and exercise. Discussed use, benefit, and side effects of prescribed medications. Barriers to medication compliance addressed. Patient given educational materials - see patient instructions. All patient questions answered. Patient voiced understanding. Return if symptoms worsen or fail to improve.     (Please note that portions of this note were completed with a voice recognition program. Efforts were made to edit the dictations but occasionally words are mis-transcribed.)      Electronically signed by KAI Torres CNP on 1/12/22 at 10:21 AM EST

## 2022-01-13 NOTE — ASSESSMENT & PLAN NOTE
Uncontrolled, changes made today: Advised to apply ointment 2-3 times daily until sore is resolved. Will have Dr. Alicia Sauceda send mupirocin prescription tomorrow as it is a [address]

## 2022-01-13 NOTE — ASSESSMENT & PLAN NOTE
Monitored by specialist- no acute findings meriting change in the plan.   Current plans to follow-up with surgeon Dr. Poli Major in 6 weeks to discuss surgery for reversal

## 2022-01-13 NOTE — TELEPHONE ENCOUNTER
Seen in office today, but utilizing [address] Please send prescription for mupirocin ointment as pended.     Please and thank you

## 2022-01-17 ENCOUNTER — OFFICE VISIT (OUTPATIENT)
Dept: FAMILY MEDICINE CLINIC | Age: 72
End: 2022-01-17
Payer: MEDICARE

## 2022-01-17 VITALS
WEIGHT: 131.8 LBS | HEART RATE: 72 BPM | DIASTOLIC BLOOD PRESSURE: 60 MMHG | BODY MASS INDEX: 24.11 KG/M2 | SYSTOLIC BLOOD PRESSURE: 110 MMHG | OXYGEN SATURATION: 99 %

## 2022-01-17 DIAGNOSIS — B00.1 RECURRENT COLD SORES: ICD-10-CM

## 2022-01-17 DIAGNOSIS — J34.89 SORE IN NOSE: Primary | ICD-10-CM

## 2022-01-17 DIAGNOSIS — Z93.3 COLOSTOMY IN PLACE (HCC): ICD-10-CM

## 2022-01-17 PROCEDURE — G8400 PT W/DXA NO RESULTS DOC: HCPCS | Performed by: FAMILY MEDICINE

## 2022-01-17 PROCEDURE — 1090F PRES/ABSN URINE INCON ASSESS: CPT | Performed by: FAMILY MEDICINE

## 2022-01-17 PROCEDURE — 99214 OFFICE O/P EST MOD 30 MIN: CPT | Performed by: FAMILY MEDICINE

## 2022-01-17 PROCEDURE — 1036F TOBACCO NON-USER: CPT | Performed by: FAMILY MEDICINE

## 2022-01-17 PROCEDURE — 4040F PNEUMOC VAC/ADMIN/RCVD: CPT | Performed by: FAMILY MEDICINE

## 2022-01-17 PROCEDURE — G8484 FLU IMMUNIZE NO ADMIN: HCPCS | Performed by: FAMILY MEDICINE

## 2022-01-17 PROCEDURE — 3017F COLORECTAL CA SCREEN DOC REV: CPT | Performed by: FAMILY MEDICINE

## 2022-01-17 PROCEDURE — G8427 DOCREV CUR MEDS BY ELIG CLIN: HCPCS | Performed by: FAMILY MEDICINE

## 2022-01-17 PROCEDURE — G8420 CALC BMI NORM PARAMETERS: HCPCS | Performed by: FAMILY MEDICINE

## 2022-01-17 PROCEDURE — 1123F ACP DISCUSS/DSCN MKR DOCD: CPT | Performed by: FAMILY MEDICINE

## 2022-01-17 RX ORDER — VALACYCLOVIR HYDROCHLORIDE 1 G/1
1000 TABLET, FILM COATED ORAL 3 TIMES DAILY
Qty: 21 TABLET | Refills: 0 | Status: SHIPPED | OUTPATIENT
Start: 2022-01-17 | End: 2022-01-20

## 2022-01-17 RX ORDER — AMOXICILLIN AND CLAVULANATE POTASSIUM 875; 125 MG/1; MG/1
1 TABLET, FILM COATED ORAL 2 TIMES DAILY
Qty: 20 TABLET | Refills: 0 | Status: SHIPPED | OUTPATIENT
Start: 2022-01-17 | End: 2022-01-27

## 2022-01-17 ASSESSMENT — ENCOUNTER SYMPTOMS
SHORTNESS OF BREATH: 0
ABDOMINAL PAIN: 0
ALLERGIC/IMMUNOLOGIC NEGATIVE: 1
EYES NEGATIVE: 1
COUGH: 0

## 2022-01-17 NOTE — PROGRESS NOTES
[facility]  [address] [facility] [address] [facility] [address]  Dept: [phone]    1/17/2022    CHIEF COMPLAINT    Chief Complaint   Patient presents with    Facial Swelling       HPI    Khadar Yan is a 66 y.o. female who presents   Chief Complaint   Patient presents with    Facial Swelling   . Recheck left intranasal lesion that has been present for several weeks. Was seen by NP who started her on bactroban. It seemed to improve then worsened. Has also had cold sores that are persistent. Taking lysine or using abrevia for cold sores. Needs to get an rx for ostomy supplies      Vitals:    01/17/22 1339   BP: 110/60   Pulse: 72   SpO2: 99%   Weight: 131 lb 12.8 oz (59.8 kg)       REVIEW OF SYSTEMS    Review of Systems   Constitutional: Negative for fatigue, fever and unexpected weight change. HENT: Negative. See hpi, nasal lesion   Eyes: Negative. Respiratory: Negative for cough and shortness of breath. Cardiovascular: Negative for chest pain and leg swelling. Gastrointestinal: Negative for abdominal pain. Has ostomy bag in place   Endocrine: Negative. Genitourinary: Negative for frequency and urgency. Musculoskeletal: Negative. Skin: Negative. Allergic/Immunologic: Negative. Neurological: Negative for dizziness and headaches. Hematological: Negative. Psychiatric/Behavioral: Negative for sleep disturbance. The patient is not nervous/anxious.         PAST MEDICAL HISTORY    Past Medical History:   Diagnosis Date    Breast cancer ([address] 2021    rt, triple negative    COVID-19 12/2020    Diverticulosis     Fatty liver 03/27/2019    Hyperlipidemia     Hypertension     Hypertriglyceridemia 03/27/2019    Irritable bowel syndrome     Rhinitis, allergic        FAMILY HISTORY    Family History   Problem Relation Age of Onset    High Blood Pressure Mother     Heart Failure Mother     High Blood Pressure BREAST LUMPECTOMY Right 06/24/2021    RIGHT BREAST  LUMPECTOMY WITH SENTINEL NODE BIOPSY AND PECS2 BLOCK performed by Rodri Cunningham MD at [facility]      COLONOSCOPY      COLOSTOMY  08/2021    perforated signmoid colon from diverticulitis following chemo    CYST REMOVAL Left     breast    HERNIA REPAIR      TONSILLECTOMY      US BREAST NEEDLE BIOPSY RIGHT Right 05/26/2021    US BREAST NEEDLE BIOPSY RIGHT 5/26/2021 [facility]       CURRENT MEDICATIONS    Current Outpatient Medications   Medication Sig Dispense Refill    amoxicillin-clavulanate (AUGMENTIN) 875-125 MG per tablet Take 1 tablet by mouth 2 times daily for 10 days 20 tablet 0    valACYclovir (VALTREX) 1 g tablet Take 1 tablet by mouth 3 times daily for 7 days 21 tablet 0    zinc gluconate 50 MG tablet Take 50 mg by mouth daily      Ascorbic Acid (VITAMIN C) 250 MG tablet Take 250 mg by mouth daily      BIOTIN PO Take by mouth      Lactobacillus-Inulin (CULTURELLE DIGESTIVE DAILY PO) Take by mouth      Cholecalciferol 50 MCG (2000 UT) TABS Take 2,000 Units by mouth daily      hydroCHLOROthiazide (HYDRODIURIL) 25 MG tablet TAKE ONE TABLET BY MOUTH DAILY 30 tablet 5    Lysine 500 MG CAPS Take by mouth      atenolol (TENORMIN) 25 MG tablet Take 1 tablet by mouth daily 90 tablet 3    mupirocin (BACTROBAN) 2 % ointment Apply topically 3 times daily. (Patient not taking: Reported on 1/17/2022) 30 g 2    mometasone (ELOCON) 0.1 % ointment  (Patient not taking: Reported on 1/17/2022)       No current facility-administered medications for this visit. ALLERGIES    Allergies   Allergen Reactions    Erythromycin     Sulfa Antibiotics Rash       PHYSICAL EXAM   Physical Exam  Vitals reviewed. Constitutional:       Appearance: She is well-developed. HENT:      Head: Normocephalic.       Nose:     Eyes:      Conjunctiva/sclera: Conjunctivae normal.      Pupils: Pupils are equal, round, and reactive to light. Neck:      Thyroid: No thyromegaly. Cardiovascular:      Rate and Rhythm: Normal rate and regular rhythm. Heart sounds: Normal heart sounds. No murmur heard. Pulmonary:      Effort: Pulmonary effort is normal.      Breath sounds: Normal breath sounds. No wheezing or rales. Abdominal:      Palpations: Abdomen is soft. Comments: Colostomy in place   Musculoskeletal:         General: No tenderness or deformity. Normal range of motion. Cervical back: Normal range of motion and neck supple. Lymphadenopathy:      Cervical: No cervical adenopathy. Skin:     General: Skin is warm and dry. Neurological:      Mental Status: She is alert and oriented to person, place, and time. Psychiatric:         Mood and Affect: Mood normal.         Behavior: Behavior normal.         Thought Content: Thought content normal.         Judgment: Judgment normal.         ASSESSMENT/PLAN  1. Sore in nose  If antibiotic and antiviral are ineffective will refer to ENT specialist  - amoxicillin-clavulanate (AUGMENTIN) 875-125 MG per tablet; Take 1 tablet by mouth 2 times daily for 10 days  Dispense: 20 tablet; Refill: 0    2. Recurrent cold sores  May continue to use topical if helpful  - valACYclovir (VALTREX) 1 g tablet; Take 1 tablet by mouth 3 times daily for 7 days  Dispense: 21 tablet; Refill: 0    3. Colostomy in place [facility])  Orders placed as requested  - [facility] [place] (Specify) as OP       Sol Mew received counseling on the following healthy behaviors: nutrition, exercise and medication adherence  Reviewed prior labs and health maintenance. Continue current medications, diet and exercise. Discussed use, benefit, and side effects of prescribed medications. Barriers to medication compliance addressed. Patient given educational materials - see patient instructions. All patient questions answered. Patient voiced understanding.       Return if symptoms worsen or fail to improve.         Electronically signed by González Joshua MD on 1/17/22 at 1:50 PM EST

## 2022-01-20 DIAGNOSIS — B00.1 RECURRENT COLD SORES: ICD-10-CM

## 2022-01-20 RX ORDER — VALACYCLOVIR HYDROCHLORIDE 1 G/1
TABLET, FILM COATED ORAL
Qty: 21 TABLET | Refills: 0 | Status: SHIPPED | OUTPATIENT
Start: 2022-01-20

## 2022-01-20 NOTE — TELEPHONE ENCOUNTER
Rissa Delgado is calling to request a refill on the following medication(s):    Last Visit Date (If Applicable):  9/71/0535    Next Visit Date:    Visit date not found    Medication Request:  Requested Prescriptions     Pending Prescriptions Disp Refills    valACYclovir (VALTREX) 1 g tablet [Pharmacy Med Name: VALACYCLOVIR HCL 1 GRAM TABLET] 21 tablet 0     Sig: TAKE ONE TABLET BY MOUTH THREE TIMES A DAY FOR 7 DAYS

## 2022-01-24 ENCOUNTER — TELEPHONE (OUTPATIENT)
Dept: FAMILY MEDICINE CLINIC | Age: 72
End: 2022-01-24

## 2022-01-24 NOTE — TELEPHONE ENCOUNTER
----- Message from Deborah Santiago sent at 1/24/2022  3:56 PM EST -----  Subject: Message to Provider    QUESTIONS  Information for Provider? pt is calling in to regards to her ostomy   supplies, the order has not gone through and pt would like it refaxed   [phone]. pt appt was on the 1/17/2022 and she is still waiting   ---------------------------------------------------------------------------  --------------  CALL BACK INFO  What is the best way for the office to contact you? OK to leave message on   voicemail  Preferred Call Back Phone Number? [phone]  ---------------------------------------------------------------------------  --------------  SCRIPT ANSWERS  Relationship to Patient?  Self

## 2022-01-31 ENCOUNTER — TELEPHONE (OUTPATIENT)
Dept: FAMILY MEDICINE CLINIC | Age: 72
End: 2022-01-31

## 2022-01-31 NOTE — TELEPHONE ENCOUNTER
----- Message from April Nagle sent at 1/31/2022  4:54 PM EST -----  Subject: Message to Provider    QUESTIONS  Information for Provider? Heather Fajardo from [employer] called   after office was closed. They need a verbal order for ostomy supplies for   this patient as soon as possible. Their call back number is [phone].   ---------------------------------------------------------------------------  --------------  CALL BACK INFO  What is the best way for the office to contact you? OK to leave message on   voicemail  Preferred Call Back Phone Number? [phone]  ---------------------------------------------------------------------------  --------------  SCRIPT ANSWERS  Relationship to Patient?  Third Party  Representative Name? Nilsa Tuttle from [facility]

## 2022-03-03 ENCOUNTER — TELEPHONE (OUTPATIENT)
Dept: FAMILY MEDICINE CLINIC | Age: 72
End: 2022-03-03

## 2022-03-29 ENCOUNTER — TELEPHONE (OUTPATIENT)
Dept: FAMILY MEDICINE CLINIC | Age: 72
End: 2022-03-29

## 2022-03-30 ENCOUNTER — TELEPHONE (OUTPATIENT)
Dept: FAMILY MEDICINE CLINIC | Age: 72
End: 2022-03-30

## 2022-03-30 NOTE — TELEPHONE ENCOUNTER
----- Message from Elzbieta Costa sent at 3/29/2022  9:20 AM EDT -----  Subject: Message to Provider    QUESTIONS  Information for Provider? Pt tried to call back but the phones are not   working in office she said she was just called . Pt said she was called   and said she needed a ppt but she said she may not need one.  ---------------------------------------------------------------------------  --------------  CALL BACK INFO  What is the best way for the office to contact you? OK to leave message on   voicemail  Preferred Call Back Phone Number? [phone]  ---------------------------------------------------------------------------  --------------  SCRIPT ANSWERS  Relationship to Patient?  Self

## 2022-04-06 ENCOUNTER — TELEPHONE (OUTPATIENT)
Dept: FAMILY MEDICINE CLINIC | Age: 72
End: 2022-04-06

## 2022-04-06 NOTE — TELEPHONE ENCOUNTER
Pt stated is going to [facility] on 04/07/2022 pt is requesting pre-op appointment could this be scheduled as vv

## 2022-04-06 NOTE — TELEPHONE ENCOUNTER
----- Message from Emerald Browne sent at 4/5/2022  4:50 PM EDT -----  Subject: Appointment Request    Reason for Call: Routine Pre-Op    QUESTIONS  Type of Appointment? New Patient/New to Provider  Reason for appointment request? No appointments available during search  Additional Information for Provider? this dee dee pre opp she is having   surgery April 21   ---------------------------------------------------------------------------  --------------  CALL BACK INFO  What is the best way for the office to contact you? OK to leave message on   voicemail  Preferred Call Back Phone Number? [phone]  ---------------------------------------------------------------------------  --------------  SCRIPT ANSWERS  Relationship to Patient? Self  Do you have questions for your provider that need to be answered prior to   scheduling your pre-op appointment? No  Have you been diagnosed with, awaiting test results for, or told that you   are suspected of having COVID-19 (Coronavirus)? (If patient has tested   negative or was tested as a requirement for work, school, or travel and   not based on symptoms, answer no)? No  Within the past 10 days have you developed any of the following symptoms   (answer no if symptoms have been present longer than 10 days or began   more than 10 days ago)? Fever or Chills, Cough, Shortness of breath or   difficulty breathing, Loss of taste or smell, Sore throat, Nasal   congestion, Sneezing or runny nose, Fatigue or generalized body aches   (answer no if pain is specific to a body part e.g. back pain), Diarrhea,   Headache? No  Have you had close contact with someone with COVID-19 in the last 7 days? No  (Service Expert  click yes below to proceed with TRData As Usual   Scheduling)?  Yes

## 2022-04-08 ENCOUNTER — TELEMEDICINE (OUTPATIENT)
Dept: FAMILY MEDICINE CLINIC | Age: 72
End: 2022-04-08
Payer: MEDICARE

## 2022-04-08 DIAGNOSIS — Z01.818 PRE-OP EXAM: ICD-10-CM

## 2022-04-08 DIAGNOSIS — Z93.3 COLOSTOMY IN PLACE (HCC): Primary | ICD-10-CM

## 2022-04-08 PROCEDURE — 99213 OFFICE O/P EST LOW 20 MIN: CPT | Performed by: FAMILY MEDICINE

## 2022-04-08 PROCEDURE — G8400 PT W/DXA NO RESULTS DOC: HCPCS | Performed by: FAMILY MEDICINE

## 2022-04-08 PROCEDURE — 1090F PRES/ABSN URINE INCON ASSESS: CPT | Performed by: FAMILY MEDICINE

## 2022-04-08 PROCEDURE — 3017F COLORECTAL CA SCREEN DOC REV: CPT | Performed by: FAMILY MEDICINE

## 2022-04-08 PROCEDURE — 4040F PNEUMOC VAC/ADMIN/RCVD: CPT | Performed by: FAMILY MEDICINE

## 2022-04-08 PROCEDURE — 1123F ACP DISCUSS/DSCN MKR DOCD: CPT | Performed by: FAMILY MEDICINE

## 2022-04-08 PROCEDURE — G8427 DOCREV CUR MEDS BY ELIG CLIN: HCPCS | Performed by: FAMILY MEDICINE

## 2022-04-08 ASSESSMENT — ENCOUNTER SYMPTOMS
EYES NEGATIVE: 1
ABDOMINAL PAIN: 0
ALLERGIC/IMMUNOLOGIC NEGATIVE: 1
SHORTNESS OF BREATH: 0
COUGH: 0

## 2022-04-08 NOTE — PROGRESS NOTES
Kaykay  FAMILY [address] [place] [address] [facility] [address]  Dept: [phone]    2022    TELEHEALTH EVALUATION -- Audio/Visual (During ZNM-94 public health emergency)  Ольга Kelley (:  1950) has requested an audio/video evaluation for the following concern(s):    HPI:  Visit video to discuss pre-op for revision of colostomy and hernia repair. Surgery is . Dr. Mauricio Urbina, surgeon with [facility]. Has never had complications with anesthesia. He will attempt robotic surgery but may need to do open procedure. There were no vitals filed for this visit. REVIEW OF SYSTEMS:   Review of Systems   Constitutional: Negative for fatigue, fever and unexpected weight change. HENT: Negative. Eyes: Negative. Respiratory: Negative for cough and shortness of breath. Cardiovascular: Negative for chest pain and leg swelling. Gastrointestinal: Negative for abdominal pain. Colostomy in place   Endocrine: Negative. Musculoskeletal: Negative. Skin: Negative. Allergic/Immunologic: Negative. Neurological: Negative for dizziness and headaches. Hematological: Negative. Psychiatric/Behavioral: Negative for sleep disturbance. The patient is not nervous/anxious.         PAST MEDICAL HISTORY:    Past Medical History:   Diagnosis Date    Breast cancer ([address]     rt, triple negative    COVID-19 2020    Diverticulosis     Fatty liver 2019    Hyperlipidemia     Hypertension     Hypertriglyceridemia 2019    Irritable bowel syndrome     Rhinitis, allergic        FAMILY HISTORY:    Family History   Problem Relation Age of Onset    High Blood Pressure Mother     Heart Failure Mother     High Blood Pressure Father     Stroke Father        SOCIAL HISTORY:    Social History     Socioeconomic History    Marital status:      Spouse name: Not on file    Number of children: 3    Years of education: Not on file    Highest education level: Not on file   Occupational History    Not on file   Tobacco Use    Smoking status: Never Smoker    Smokeless tobacco: Never Used   Vaping Use    Vaping Use: Never used   Substance and Sexual Activity    Alcohol use: No    Drug use: No    Sexual activity: Yes     Partners: Male   Other Topics Concern    Not on file   Social History Narrative    Not on file     Social Determinants of Health     Financial Resource Strain: Low Risk     Difficulty of Paying Living Expenses: Not hard at all   Food Insecurity: No Food Insecurity    Worried About [address] in the Last Year: Never true    [facility] in the Last Year: Never true   Transportation Needs: No Transportation Needs    Lack of Transportation (Medical): No    Lack of Transportation (Non-Medical):  No   Physical Activity:     Days of Exercise per Week: Not on file    Minutes of Exercise per Session: Not on file   Stress:     Feeling of Stress : Not on file   Social Connections:     Frequency of Communication with Friends and Family: Not on file    Frequency of Social Gatherings with Friends and Family: Not on file    Attends Shinto Services: Not on file    Active Member of [address] [place] [address] or Organizations: Not on file    Attends Club or Organization Meetings: Not on file    Marital Status: Not on file   Intimate Partner Violence:     Fear of Current or Ex-Partner: Not on file    Emotionally Abused: Not on file    Physically Abused: Not on file    Sexually Abused: Not on file   Housing Stability:     Unable to Pay for Housing in the Last Year: Not on file    Number of [place] in the Last Year: Not on file    Unstable Housing in the Last Year: Not on file       SURGICAL HISTORY:    Past Surgical History:   Procedure Laterality Date    BREAST LUMPECTOMY Right 06/24/2021    RIGHT BREAST  LUMPECTOMY WITH SENTINEL NODE BIOPSY AND PECS2 BLOCK performed by Irma Live MD at [address] [place] [address]  SECTION      CHOLECYSTECTOMY      COLONOSCOPY      COLOSTOMY  2021    perforated signmoid colon from diverticulitis following chemo    CYST REMOVAL Left     breast    HERNIA REPAIR      TONSILLECTOMY      US BREAST NEEDLE BIOPSY RIGHT Right 2021    US BREAST NEEDLE BIOPSY RIGHT 2021 [facility]       CURRENT MEDICATIONS:    Current Outpatient Medications   Medication Sig Dispense Refill    zinc gluconate 50 MG tablet Take 50 mg by mouth daily      Ascorbic Acid (VITAMIN C) 250 MG tablet Take 250 mg by mouth daily      Lactobacillus-Inulin (CULTURELLE DIGESTIVE DAILY PO) Take by mouth      hydroCHLOROthiazide (HYDRODIURIL) 25 MG tablet TAKE ONE TABLET BY MOUTH DAILY 30 tablet 5    atenolol (TENORMIN) 25 MG tablet Take 1 tablet by mouth daily 90 tablet 3    valACYclovir (VALTREX) 1 g tablet TAKE ONE TABLET BY MOUTH THREE TIMES A DAY FOR 7 DAYS (Patient not taking: Reported on 2022) 21 tablet 0    mometasone (ELOCON) 0.1 % ointment  (Patient not taking: Reported on 2022)      BIOTIN PO Take by mouth (Patient not taking: Reported on 2022)      Cholecalciferol 50 MCG (2000 UT) TABS Take 2,000 Units by mouth daily (Patient not taking: Reported on 2022)      Lysine 500 MG CAPS Take by mouth (Patient not taking: Reported on 2022)       No current facility-administered medications for this visit. ALLERGIES:   Allergies   Allergen Reactions    Erythromycin     Sulfa Antibiotics Rash       PHYSICAL EXAM:   Physical Exam  Vitals reviewed. Constitutional:       Appearance: Normal appearance. HENT:      Head: Normocephalic. Eyes:      Conjunctiva/sclera: Conjunctivae normal.   Pulmonary:      Effort: Pulmonary effort is normal.   Musculoskeletal:         General: Normal range of motion. Cervical back: Normal range of motion. Skin:     Findings: No rash. Neurological:      General: No focal deficit present.       Mental Status: She is alert and oriented to person, place, and time. Mental status is at baseline. Psychiatric:         Mood and Affect: Mood normal.         Behavior: Behavior normal.         Thought Content: Thought content normal.         Judgment: Judgment normal.          ASSESSMENT/PLAN:  1. Colostomy in place [facility])  Expect proposed surgery 4/21. Reviewed labs, mildly decreased hgb but improved since October. Bmp normal.     2. Pre-op exam  Will review ekg and send pre-op clearance if normal.       Return if symptoms worsen or fail to improve. Junaid Morrissey is a 66 y.o. female being evaluated by a Virtual Visit (video visit) encounter to address concerns as mentioned above. A caregiver was present when appropriate. Due to this being a TeleHealth encounter (During [facility] public health emergency), evaluation of the following organ systems was limited: Vitals/Constitutional/EENT/Resp/CV/GI//MS/Neuro/Skin/Heme-Lymph-Imm. Pursuant to the emergency declaration under the [address] [place] [address] authority and the [employer] and [employer] Act, this Virtual Visit was conducted with patient's (and/or legal guardian's) consent, to reduce the patient's risk of exposure to COVID-19 and provide necessary medical care. The patient (and/or legal guardian) has also been advised to contact this office for worsening conditions or problems, and seek emergency medical treatment and/or call 911 if deemed necessary. Services were provided through a video synchronous discussion virtually to substitute for in-person clinic visit. Patient and provider were located at their individual homes. --Fam Dao MD on 4/8/2022 at 11:47 AM    An electronic signature was used to authenticate this note.

## 2022-04-12 ENCOUNTER — TELEPHONE (OUTPATIENT)
Dept: FAMILY MEDICINE CLINIC | Age: 72
End: 2022-04-12

## 2022-04-12 NOTE — LETTER
[address] [facility], [address] [facility]  [phone]    Dr. 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 Based on review of information available to me at this time, the patient appears to be at no increased risk for the planned procedure. Pre op labs have been reviewed. EKG has been cleared. Current Outpatient Medications on File Prior to Visit   Medication Sig Dispense Refill    valACYclovir (VALTREX) 1 g tablet TAKE ONE TABLET BY MOUTH THREE TIMES A DAY FOR 7 DAYS (Patient not taking: Reported on 2022) 21 tablet 0    zinc gluconate 50 MG tablet Take 50 mg by mouth daily      Ascorbic Acid (VITAMIN C) 250 MG tablet Take 250 mg by mouth daily      mometasone (ELOCON) 0.1 % ointment  (Patient not taking: Reported on 2022)      BIOTIN PO Take by mouth (Patient not taking: Reported on 2022)      Lactobacillus-Inulin (CULTURELLE DIGESTIVE DAILY PO) Take by mouth      Cholecalciferol 50 MCG (2000 UT) TABS Take 2,000 Units by mouth daily (Patient not taking: Reported on 2022)      hydroCHLOROthiazide (HYDRODIURIL) 25 MG tablet TAKE ONE TABLET BY MOUTH DAILY 30 tablet 5    Lysine 500 MG CAPS Take by mouth (Patient not taking: Reported on 2022)      atenolol (TENORMIN) 25 MG tablet Take 1 tablet by mouth daily 90 tablet 3     No current facility-administered medications on file prior to visit.        Past Surgical History:   Procedure Laterality Date    BREAST LUMPECTOMY Right 2021    RIGHT BREAST  LUMPECTOMY WITH SENTINEL NODE BIOPSY AND PECS2 BLOCK performed by Jennifer Bowen MD at [address] [place] [address]      COLONOSCOPY      COLOSTOMY  2021    perforated signmoid colon from diverticulitis following chemo    CYST REMOVAL Left     breast    HERNIA REPAIR      TONSILLECTOMY      US BREAST NEEDLE BIOPSY RIGHT Right 05/26/2021    US BREAST NEEDLE BIOPSY RIGHT 5/26/2021 [facility]         If you have any questions, please feel free to contact me.     Sincerely,        Dr. Isma Sharpe MD

## 2022-04-12 NOTE — TELEPHONE ENCOUNTER
Patient called in wondering if ekg was looked at in order to know if she can get her surgery done. ekg results are in computer under care everywhere. Printed results and out on Dr. Katie Cruz desk. Needs surgery clearance. Can you advise on this? Thank you.

## 2022-04-12 NOTE — TELEPHONE ENCOUNTER
There is no EKG report in the printed off results. You will have to call [facility] and get a direct copy of the EKG tracing.   Thank you

## 2022-04-28 DIAGNOSIS — I10 ESSENTIAL HYPERTENSION: ICD-10-CM

## 2022-04-28 RX ORDER — HYDROCHLOROTHIAZIDE 25 MG/1
TABLET ORAL
Qty: 30 TABLET | Refills: 5 | Status: SHIPPED | OUTPATIENT
Start: 2022-04-28 | End: 2022-10-27

## 2022-04-28 RX ORDER — ATENOLOL 25 MG/1
TABLET ORAL
Qty: 60 TABLET | Refills: 5 | Status: SHIPPED | OUTPATIENT
Start: 2022-04-28 | End: 2022-10-31

## 2022-10-06 ENCOUNTER — OFFICE VISIT (OUTPATIENT)
Dept: FAMILY MEDICINE CLINIC | Age: 72
End: 2022-10-06
Payer: MEDICARE

## 2022-10-06 VITALS
BODY MASS INDEX: 24.91 KG/M2 | HEART RATE: 67 BPM | OXYGEN SATURATION: 100 % | SYSTOLIC BLOOD PRESSURE: 130 MMHG | DIASTOLIC BLOOD PRESSURE: 76 MMHG | WEIGHT: 136.2 LBS

## 2022-10-06 DIAGNOSIS — Z17.1 MALIGNANT NEOPLASM OF RIGHT BREAST IN FEMALE, ESTROGEN RECEPTOR NEGATIVE, UNSPECIFIED SITE OF BREAST (HCC): ICD-10-CM

## 2022-10-06 DIAGNOSIS — Z00.00 MEDICARE ANNUAL WELLNESS VISIT, SUBSEQUENT: Primary | ICD-10-CM

## 2022-10-06 DIAGNOSIS — R06.02 SOB (SHORTNESS OF BREATH): ICD-10-CM

## 2022-10-06 DIAGNOSIS — C50.911 MALIGNANT NEOPLASM OF RIGHT BREAST IN FEMALE, ESTROGEN RECEPTOR NEGATIVE, UNSPECIFIED SITE OF BREAST (HCC): ICD-10-CM

## 2022-10-06 DIAGNOSIS — I10 ESSENTIAL HYPERTENSION: ICD-10-CM

## 2022-10-06 PROCEDURE — 3017F COLORECTAL CA SCREEN DOC REV: CPT | Performed by: FAMILY MEDICINE

## 2022-10-06 PROCEDURE — G0439 PPPS, SUBSEQ VISIT: HCPCS | Performed by: FAMILY MEDICINE

## 2022-10-06 PROCEDURE — G8484 FLU IMMUNIZE NO ADMIN: HCPCS | Performed by: FAMILY MEDICINE

## 2022-10-06 PROCEDURE — 1123F ACP DISCUSS/DSCN MKR DOCD: CPT | Performed by: FAMILY MEDICINE

## 2022-10-06 SDOH — ECONOMIC STABILITY: FOOD INSECURITY: WITHIN THE PAST 12 MONTHS, YOU WORRIED THAT YOUR FOOD WOULD RUN OUT BEFORE YOU GOT MONEY TO BUY MORE.: NEVER TRUE

## 2022-10-06 SDOH — ECONOMIC STABILITY: FOOD INSECURITY: WITHIN THE PAST 12 MONTHS, THE FOOD YOU BOUGHT JUST DIDN'T LAST AND YOU DIDN'T HAVE MONEY TO GET MORE.: NEVER TRUE

## 2022-10-06 ASSESSMENT — ENCOUNTER SYMPTOMS
EYES NEGATIVE: 1
ABDOMINAL PAIN: 0
ALLERGIC/IMMUNOLOGIC NEGATIVE: 1
BLOOD IN STOOL: 0
SHORTNESS OF BREATH: 0
CONSTIPATION: 0
COUGH: 0
CHEST TIGHTNESS: 1
DIARRHEA: 0

## 2022-10-06 ASSESSMENT — SOCIAL DETERMINANTS OF HEALTH (SDOH): HOW HARD IS IT FOR YOU TO PAY FOR THE VERY BASICS LIKE FOOD, HOUSING, MEDICAL CARE, AND HEATING?: NOT HARD AT ALL

## 2022-10-06 ASSESSMENT — PATIENT HEALTH QUESTIONNAIRE - PHQ9
SUM OF ALL RESPONSES TO PHQ QUESTIONS 1-9: 0
2. FEELING DOWN, DEPRESSED OR HOPELESS: 0
SUM OF ALL RESPONSES TO PHQ9 QUESTIONS 1 & 2: 0
1. LITTLE INTEREST OR PLEASURE IN DOING THINGS: 0
DEPRESSION UNABLE TO ASSESS: FUNCTIONAL CAPACITY MOTIVATION LIMITS ACCURACY
SUM OF ALL RESPONSES TO PHQ QUESTIONS 1-9: 0

## 2022-10-06 ASSESSMENT — LIFESTYLE VARIABLES: HOW MANY STANDARD DRINKS CONTAINING ALCOHOL DO YOU HAVE ON A TYPICAL DAY: PATIENT DOES NOT DRINK

## 2022-10-06 NOTE — PATIENT INSTRUCTIONS
Personalized Preventive Plan for Sepideh Ruiz - 10/6/2022  Medicare offers a range of preventive health benefits. Some of the tests and screenings are paid in full while other may be subject to a deductible, co-insurance, and/or copay. Some of these benefits include a comprehensive review of your medical history including lifestyle, illnesses that may run in your family, and various assessments and screenings as appropriate. After reviewing your medical record and screening and assessments performed today your provider may have ordered immunizations, labs, imaging, and/or referrals for you. A list of these orders (if applicable) as well as your Preventive Care list are included within your After Visit Summary for your review. Other Preventive Recommendations:    A preventive eye exam performed by an eye specialist is recommended every 1-2 years to screen for glaucoma; cataracts, macular degeneration, and other eye disorders. A preventive dental visit is recommended every 6 months. Try to get at least 150 minutes of exercise per week or 10,000 steps per day on a pedometer . Order or download the FREE \"Exercise & Physical Activity: Your Everyday Guide\" from [employer]. Call 6-341.452.2836 or search [employer] online. You need 4896-8765 mg of calcium and 1981-8543 IU of vitamin D per day. It is possible to meet your calcium requirement with diet alone, but a vitamin D supplement is usually necessary to meet this goal.  When exposed to the sun, use a sunscreen that protects against both UVA and UVB radiation with an SPF of 30 or greater. Reapply every 2 to 3 hours or after sweating, drying off with a towel, or swimming. Always wear a seat belt when traveling in a car. Always wear a helmet when riding a bicycle or motorcycle.

## 2022-10-06 NOTE — PROGRESS NOTES
Medicare Annual Wellness Visit    Jessica Colin is here for Medicare AWV (Patient is here today for a MAW)  Here for AMW. History of right breast cancer, has had lumpectomy, chemo and radiation. She is following with [facility] in [place]. She recently had blood work and a CT of the chest and abdomen. Results are not available in her record. Has been having increasing shortness of breath and upper chest sensation of discomfort. This has occurred after exercise such as going up and down flights of stairs. Can last up to 10 minutes. She has not had any cardiac work-up in the past other than EKGs. Last EKG was in April prior to colostomy revision at [facility].  It is not in her record but she was never told she had an abnormal reading. Her chemotherapy was reported as [facility] protocol. She does not believe she ever had Adriamycin. Assessment & Plan   Medicare annual wellness visit, subsequent  SOB (shortness of breath)  -     ECHO Complete 2D W Doppler W Color; Future  Continue to monitor symptoms. If symptoms persist or worsen I would recommend stress test and/or cardiac work-up. We will have staff call [facility] centers [facility] to get her latest labs. If lipids were not included then I will order them. Prior lipids were elevated. Malignant neoplasm of right breast in female, estrogen receptor negative, unspecified site of breast [facility])  Assessment & Plan:   Monitored by specialist- no acute findings meriting change in the plan  Stable call [facility] to get her most recent work-up    Essential hypertension    Recommendations for Preventive Services Due: see orders and patient instructions/AVS.  Recommended screening schedule for the next 5-10 years is provided to the patient in written form: see Patient Instructions/AVS.     Return in 6 months (on 4/6/2023) for Medicare Annual Wellness Visit in 1 year, htn.      Subjective     Review of Systems   Constitutional: Negative for fatigue, fever and unexpected weight change. HENT: Negative. Eyes: Negative. Respiratory:  Positive for chest tightness. Negative for cough and shortness of breath. Cardiovascular:  Negative for chest pain, palpitations and leg swelling. Gastrointestinal:  Negative for abdominal pain, blood in stool, constipation and diarrhea. Endocrine: Negative. Genitourinary:  Negative for frequency and urgency. Musculoskeletal:  Positive for arthralgias. Skin: Negative. Allergic/Immunologic: Negative. Neurological:  Negative for dizziness and headaches. Hematological: Negative. Psychiatric/Behavioral:  Negative for sleep disturbance. The patient is not nervous/anxious. Patient's complete Health Risk Assessment and screening values have been reviewed and are found in Flowsheets. The following problems were reviewed today and where indicated follow up appointments were made and/or referrals ordered.     Positive Risk Factor Screenings with Interventions:     Cognitive:  Clock Drawing Test (CDT): Normal  Total Score Interpretation: Abnormal Mini-Cog  Cognitive Impairment Interventions:  No needs identified    Depression:  Depression Unable to Assess: Functional capacity motivation limits accuracy  PHQ-2 Score: 0  PHQ-9 Total Score: 0    Severity:1-4 = minimal depression, 5-9 = mild depression, 10-14 = moderate depression, 15-19 = moderately severe depression, 20-27 = severe depression        General Health and ACP:  General  In general, how would you say your health is?: Good  In the past 7 days, have you experienced any of the following: New or Increased Pain, New or Increased Fatigue, Loneliness, Social Isolation, Stress or Anger?: No  Do you get the social and emotional support that you need?: Yes  Do you have a Living Will?: Yes    Advance Directives       Power of  Living Will ACP-Advance Directive ACP-Power of     Not on File Not on File Not on File Not on File          General Health Risk Interventions:  No needs identified    Health Habits/Nutrition:  Physical Activity: Inactive    Days of Exercise per Week: 0 days    Minutes of Exercise per Session: 0 min     Have you lost any weight without trying in the past 3 months?: No     Have you seen the dentist within the past year?: Yes  Health Habits/Nutrition Interventions:  Inadequate physical activity: She will increase walking    Hearing/Vision:  Do you or your family notice any trouble with your hearing that hasn't been managed with hearing aids?: No  Do you have difficulty driving, watching TV, or doing any of your daily activities because of your eyesight?: No  Have you had an eye exam within the past year?: (!) No  No results found. Hearing/Vision Interventions:  Vision concerns:  patient encouraged to make appointment with his/her eye specialist            Objective   Vitals:    10/06/22 0738   BP: 130/76   Pulse: 67   SpO2: 100%   Weight: 136 lb 3.2 oz (61.8 kg)      Body mass index is 24.91 kg/m². Physical Exam  Vitals reviewed. Constitutional:       Appearance: She is well-developed. HENT:      Head: Normocephalic. Eyes:      Pupils: Pupils are equal, round, and reactive to light. Neck:      Thyroid: No thyromegaly. Cardiovascular:      Rate and Rhythm: Normal rate and regular rhythm. Heart sounds: Normal heart sounds. No murmur heard. Pulmonary:      Effort: Pulmonary effort is normal.      Breath sounds: Normal breath sounds. No wheezing or rales. Abdominal:      Palpations: Abdomen is soft. Tenderness: There is no abdominal tenderness. There is no guarding or rebound. Comments: Multiple healed surgical incisions   Musculoskeletal:         General: Deformity (Osteoarthritic changes) present. No tenderness. Normal range of motion. Cervical back: Normal range of motion and neck supple. Lymphadenopathy:      Cervical: No cervical adenopathy.    Skin:     General: Skin is warm and dry. Neurological:      Mental Status: She is alert and oriented to person, place, and time. Psychiatric:         Mood and Affect: Mood normal.         Behavior: Behavior normal.         Thought Content: Thought content normal.         Judgment: Judgment normal.           Allergies   Allergen Reactions    Erythromycin     Sulfa Antibiotics Rash     Prior to Visit Medications    Medication Sig Taking?  Authorizing Provider   hydroCHLOROthiazide (HYDRODIURIL) 25 MG tablet TAKE ONE TABLET BY MOUTH DAILY Yes Vinnie Romeo MD   atenolol (TENORMIN) 25 MG tablet TAKE ONE TABLET BY MOUTH TWICE A DAY Yes Vinnie Romeo MD   zinc gluconate 50 MG tablet Take 50 mg by mouth daily Yes Historical Provider, MD   Ascorbic Acid (VITAMIN C) 250 MG tablet Take 250 mg by mouth daily Yes Historical Provider, MD   Lactobacillus-Inulin (CULTURELLE DIGESTIVE DAILY PO) Take by mouth Yes Historical Provider, MD   valACYclovir (VALTREX) 1 g tablet TAKE ONE TABLET BY MOUTH THREE TIMES A DAY FOR 7 DAYS  Patient not taking: No sig reported  Vinnie Romeo MD   mometasone (ELOCON) 0.1 % ointment   Historical Provider, MD   BIOTIN PO Take by mouth  Patient not taking: No sig reported  Historical Provider, MD   Cholecalciferol 50 MCG (2000 UT) TABS Take 2,000 Units by mouth daily  Patient not taking: No sig reported  Historical Provider, MD   Lysine 500 MG CAPS Take by mouth  Patient not taking: No sig reported  Historical Provider, MD [place] (Including outside providers/suppliers regularly involved in providing care):   Patient Care Team:  Vinnie Romeo MD as PCP - General (Family Medicine)  Vinnie Romeo MD as PCP - [facility] Empaneled Provider     Reviewed and updated this visit:  Allergies  Meds  Med Hx  Surg Hx  Soc Hx  Fam Hx

## 2022-10-10 ENCOUNTER — HOSPITAL ENCOUNTER (OUTPATIENT)
Dept: NON INVASIVE DIAGNOSTICS | Age: 72
Discharge: HOME | End: 2022-10-10
Payer: MEDICARE

## 2022-10-10 DIAGNOSIS — R06.02 SOB (SHORTNESS OF BREATH): ICD-10-CM

## 2022-10-10 LAB
LV EF: 55 %
LVEF MODALITY: NORMAL

## 2022-10-10 PROCEDURE — 93306 TTE W/DOPPLER COMPLETE: CPT

## 2022-10-27 DIAGNOSIS — I10 ESSENTIAL HYPERTENSION: ICD-10-CM

## 2022-10-27 RX ORDER — HYDROCHLOROTHIAZIDE 25 MG/1
TABLET ORAL
Qty: 90 TABLET | Refills: 3 | Status: SHIPPED | OUTPATIENT
Start: 2022-10-27

## 2022-10-30 DIAGNOSIS — I10 ESSENTIAL HYPERTENSION: ICD-10-CM

## 2022-10-31 RX ORDER — ATENOLOL 25 MG/1
TABLET ORAL
Qty: 60 TABLET | Refills: 5 | Status: SHIPPED | OUTPATIENT
Start: 2022-10-31

## 2022-11-30 ENCOUNTER — TELEPHONE (OUTPATIENT)
Dept: FAMILY MEDICINE CLINIC | Age: 72
End: 2022-11-30

## 2022-11-30 DIAGNOSIS — R06.02 SOB (SHORTNESS OF BREATH) ON EXERTION: ICD-10-CM

## 2022-11-30 DIAGNOSIS — R07.9 CHEST PAIN, UNSPECIFIED TYPE: Primary | ICD-10-CM

## 2022-11-30 NOTE — TELEPHONE ENCOUNTER
Patient is requesting cardiology referral placed in chart due to issues with chest pain while walking  300 ft or more. Patient lov 10/6/22, no future appt scheduled.

## 2023-02-20 ENCOUNTER — OFFICE VISIT (OUTPATIENT)
Dept: FAMILY MEDICINE CLINIC | Age: 73
End: 2023-02-20
Payer: MEDICARE

## 2023-02-20 VITALS
DIASTOLIC BLOOD PRESSURE: 72 MMHG | SYSTOLIC BLOOD PRESSURE: 118 MMHG | HEIGHT: 62 IN | BODY MASS INDEX: 25.51 KG/M2 | WEIGHT: 138.6 LBS | HEART RATE: 74 BPM

## 2023-02-20 DIAGNOSIS — C50.911 MALIGNANT NEOPLASM OF RIGHT BREAST IN FEMALE, ESTROGEN RECEPTOR NEGATIVE, UNSPECIFIED SITE OF BREAST (HCC): ICD-10-CM

## 2023-02-20 DIAGNOSIS — Z17.1 MALIGNANT NEOPLASM OF RIGHT BREAST IN FEMALE, ESTROGEN RECEPTOR NEGATIVE, UNSPECIFIED SITE OF BREAST (HCC): ICD-10-CM

## 2023-02-20 DIAGNOSIS — I25.10 CORONARY ARTERY DISEASE INVOLVING NATIVE CORONARY ARTERY OF NATIVE HEART WITHOUT ANGINA PECTORIS: Primary | ICD-10-CM

## 2023-02-20 DIAGNOSIS — R14.2 ERUCTATION: ICD-10-CM

## 2023-02-20 DIAGNOSIS — E78.2 MIXED HYPERLIPIDEMIA: ICD-10-CM

## 2023-02-20 DIAGNOSIS — Z95.1 S/P CABG X 1: ICD-10-CM

## 2023-02-20 DIAGNOSIS — I10 ESSENTIAL HYPERTENSION: ICD-10-CM

## 2023-02-20 PROBLEM — Z93.3 COLOSTOMY IN PLACE (HCC): Status: RESOLVED | Noted: 2021-09-16 | Resolved: 2023-02-20

## 2023-02-20 PROCEDURE — G8400 PT W/DXA NO RESULTS DOC: HCPCS | Performed by: FAMILY MEDICINE

## 2023-02-20 PROCEDURE — 3074F SYST BP LT 130 MM HG: CPT | Performed by: FAMILY MEDICINE

## 2023-02-20 PROCEDURE — 1123F ACP DISCUSS/DSCN MKR DOCD: CPT | Performed by: FAMILY MEDICINE

## 2023-02-20 PROCEDURE — G8484 FLU IMMUNIZE NO ADMIN: HCPCS | Performed by: FAMILY MEDICINE

## 2023-02-20 PROCEDURE — 99214 OFFICE O/P EST MOD 30 MIN: CPT | Performed by: FAMILY MEDICINE

## 2023-02-20 PROCEDURE — 1036F TOBACCO NON-USER: CPT | Performed by: FAMILY MEDICINE

## 2023-02-20 PROCEDURE — G8427 DOCREV CUR MEDS BY ELIG CLIN: HCPCS | Performed by: FAMILY MEDICINE

## 2023-02-20 PROCEDURE — 1090F PRES/ABSN URINE INCON ASSESS: CPT | Performed by: FAMILY MEDICINE

## 2023-02-20 PROCEDURE — 3078F DIAST BP <80 MM HG: CPT | Performed by: FAMILY MEDICINE

## 2023-02-20 PROCEDURE — G8419 CALC BMI OUT NRM PARAM NOF/U: HCPCS | Performed by: FAMILY MEDICINE

## 2023-02-20 PROCEDURE — 3017F COLORECTAL CA SCREEN DOC REV: CPT | Performed by: FAMILY MEDICINE

## 2023-02-20 RX ORDER — ATORVASTATIN CALCIUM 40 MG/1
TABLET, FILM COATED ORAL
COMMUNITY
Start: 2023-01-10 | End: 2023-02-20 | Stop reason: DRUGHIGH

## 2023-02-20 RX ORDER — ATORVASTATIN CALCIUM 40 MG/1
20 TABLET, FILM COATED ORAL DAILY
Qty: 30 TABLET | Refills: 5 | Status: SHIPPED
Start: 2023-02-20

## 2023-02-20 SDOH — ECONOMIC STABILITY: FOOD INSECURITY: WITHIN THE PAST 12 MONTHS, YOU WORRIED THAT YOUR FOOD WOULD RUN OUT BEFORE YOU GOT MONEY TO BUY MORE.: NEVER TRUE

## 2023-02-20 SDOH — ECONOMIC STABILITY: FOOD INSECURITY: WITHIN THE PAST 12 MONTHS, THE FOOD YOU BOUGHT JUST DIDN'T LAST AND YOU DIDN'T HAVE MONEY TO GET MORE.: NEVER TRUE

## 2023-02-20 SDOH — ECONOMIC STABILITY: INCOME INSECURITY: HOW HARD IS IT FOR YOU TO PAY FOR THE VERY BASICS LIKE FOOD, HOUSING, MEDICAL CARE, AND HEATING?: NOT HARD AT ALL

## 2023-02-20 SDOH — ECONOMIC STABILITY: HOUSING INSECURITY
IN THE LAST 12 MONTHS, WAS THERE A TIME WHEN YOU DID NOT HAVE A STEADY PLACE TO SLEEP OR SLEPT IN A SHELTER (INCLUDING NOW)?: NO

## 2023-02-20 ASSESSMENT — ENCOUNTER SYMPTOMS
SHORTNESS OF BREATH: 0
EYES NEGATIVE: 1
CONSTIPATION: 0
ALLERGIC/IMMUNOLOGIC NEGATIVE: 1
ABDOMINAL PAIN: 0
COUGH: 0
DIARRHEA: 0
BLOOD IN STOOL: 0

## 2023-02-20 NOTE — PROGRESS NOTES
[facility] [address] [place] [address]  Dept: [phone]    2/20/2023    CHIEF COMPLAINT    Chief Complaint   Patient presents with    Follow-Up from Hospital       [facility]    Moris Cain is a 67 y.o. female who presents   Chief Complaint   Patient presents with    Follow-Up from [address] [place] [address] after CABG in January 2023 at [facility], Dr. Christopher Olivarez. Had LAD blockage. Had been having some shortness of breath with exertion. Still having post op pain, improving. Has excess burping since surgery which has happened after other surgeries. Started taking prilosec today. Seeing cardiologist, Dr. Teresa Sanabria. Taking statin, did not tolerate statin in the past. She is taking half a dose of statin. Hx of breast cancer, seeing oncologist at [facility]. Vitals:    02/20/23 0948   BP: 118/72   Pulse: 74   Weight: 138 lb 9.6 oz (62.9 kg)   Height: 5' 2\" (1.575 m)       REVIEW OF SYSTEMS    Review of Systems   Constitutional:  Positive for fatigue. Negative for fever and unexpected weight change. HENT: Negative. Eyes: Negative. Respiratory:  Negative for cough and shortness of breath. Cardiovascular:  Negative for chest pain and leg swelling. Gastrointestinal:  Negative for abdominal pain, blood in stool, constipation and diarrhea. Endocrine: Negative. Genitourinary:  Negative for frequency and urgency. Musculoskeletal: Negative. Skin: Negative. Allergic/Immunologic: Negative. Neurological:  Negative for dizziness and headaches. Hematological: Negative. Psychiatric/Behavioral:  Negative for sleep disturbance. The patient is not nervous/anxious.       PAST MEDICAL HISTORY    Past Medical History:   Diagnosis Date    Breast cancer ([address] 2021    rt, triple negative    CAD (coronary artery disease) 2023    COVID-19 12/2020    Diverticulosis     Fatty liver 03/27/2019    Hyperlipidemia Hypertension     Hypertriglyceridemia 03/27/2019    Irritable bowel syndrome     Rhinitis, allergic     S/P CABG x 1 01/2023    LIMA to LAD       FAMILY HISTORY    Family History   Problem Relation Age of Onset    High Blood Pressure Mother     Heart Failure Mother     High Blood Pressure Father     Stroke Father        SOCIAL HISTORY    Social History     Socioeconomic History    Marital status:      Spouse name: None    Number of children: 3    Years of education: None    Highest education level: None   Tobacco Use    Smoking status: Never    Smokeless tobacco: Never   Vaping Use    Vaping Use: Never used   Substance and Sexual Activity    Alcohol use: No    Drug use: No    Sexual activity: Yes     Partners: Male     Social Determinants of Health     Financial Resource Strain: Low Risk     Difficulty of Paying Living Expenses: Not hard at all   Food Insecurity: No Food Insecurity    Worried About Running Out of Food in the Last Year: Never true    Ran Out of Food in the Last Year: Never true   Transportation Needs: Unknown    Lack of Transportation (Non-Medical):  No   Physical Activity: Inactive    Days of Exercise per Week: 0 days    Minutes of Exercise per Session: 0 min   Housing Stability: Unknown    Unstable Housing in the Last Year: No       SURGICAL HISTORY    Past Surgical History:   Procedure Laterality Date    BREAST LUMPECTOMY Right 06/24/2021    RIGHT BREAST  LUMPECTOMY WITH SENTINEL NODE BIOPSY AND PECS2 BLOCK performed by Gian Larios MD at [address] [place] [address]  08/2021    perforated signmoid colon from diverticulitis following chemo    CORONARY ARTERY BYPASS GRAFT Left 01/2023    LAD    CYST REMOVAL Left     breast    HERNIA REPAIR      REVISION COLOSTOMY  04/2022    TONSILLECTOMY      US BREAST BIOPSY W LOC DEVICE 1ST LESION RIGHT Right 05/26/2021    US BREAST NEEDLE BIOPSY RIGHT 5/26/2021 [facility] ULTRASOUND       CURRENT MEDICATIONS    Current Outpatient Medications   Medication Sig Dispense Refill    atorvastatin (LIPITOR) 40 MG tablet 0.5 tablets daily 30 tablet 5    atenolol (TENORMIN) 25 MG tablet TAKE ONE TABLET BY MOUTH TWICE A DAY 60 tablet 5    hydroCHLOROthiazide (HYDRODIURIL) 25 MG tablet TAKE ONE TABLET BY MOUTH DAILY 90 tablet 3    zinc gluconate 50 MG tablet Take 50 mg by mouth daily      Ascorbic Acid (VITAMIN C) 250 MG tablet Take 250 mg by mouth daily      Lactobacillus-Inulin (CULTURELLE DIGESTIVE DAILY PO) Take by mouth      Cholecalciferol 50 MCG (2000 UT) TABS Take 2,000 Units by mouth daily       No current facility-administered medications for this visit. ALLERGIES    Allergies   Allergen Reactions    Erythromycin     Sulfa Antibiotics Rash       PHYSICAL EXAM   Physical Exam  Vitals reviewed. Constitutional:       Appearance: She is well-developed. HENT:      Head: Normocephalic. Eyes:      Pupils: Pupils are equal, round, and reactive to light. Neck:      Thyroid: No thyromegaly. Cardiovascular:      Rate and Rhythm: Normal rate and regular rhythm. Heart sounds: Normal heart sounds. No murmur heard. Pulmonary:      Effort: Pulmonary effort is normal.      Breath sounds: Normal breath sounds. No wheezing or rales. Chest:          Comments: Healed surgical scar  Abdominal:      Palpations: Abdomen is soft. Tenderness: There is no abdominal tenderness. There is no guarding or rebound. Musculoskeletal:         General: No tenderness or deformity. Normal range of motion. Cervical back: Normal range of motion and neck supple. Lymphadenopathy:      Cervical: No cervical adenopathy. Skin:     General: Skin is warm and dry. Neurological:      Mental Status: She is alert and oriented to person, place, and time. Psychiatric:         Mood and Affect: Mood normal.         Behavior: Behavior normal.         Thought Content:  Thought content normal.         Judgment: Judgment normal.       ASSESSMENT/PLAN  1. Coronary artery disease involving native coronary artery of native heart without angina pectoris  Cont to follow with cardiologist, risk reduction     2. Essential hypertension  Chronic and controlled. Says she is only controlled with atenolol. Does not do well on metoprolol    3. Malignant neoplasm of right breast in female, estrogen receptor negative, unspecified site of breast ([address]  Continue following with [facility]    4. Mixed hyperlipidemia  Continue statin but she has reduced dose due to some intolerance and concern about side effects. Discussed further with cardiologist    5. S/P CABG x 1  Continue follow-up with with cardiology and risk reduction    6. Eructation  Take PPI and if not resolved may consider GI referral    Jessica Tello received counseling on the following healthy behaviors: nutrition, exercise, and medication adherence  Reviewed prior labs and health maintenance. Continue current medications, diet and exercise. Discussed use, benefit, and side effects of prescribed medications. Barriers to medication compliance addressed. Patient given educational materials - see patient instructions. All patient questions answered. Patient voiced understanding. Return in about 6 months (around 8/20/2023).         Electronically signed by Low Mackenzie MD on 2/20/23 at 10:02 AM EST

## 2023-05-02 DIAGNOSIS — I10 ESSENTIAL HYPERTENSION: ICD-10-CM

## 2023-05-02 RX ORDER — ATENOLOL 25 MG/1
TABLET ORAL
Qty: 60 TABLET | Refills: 5 | Status: SHIPPED | OUTPATIENT
Start: 2023-05-02

## 2023-05-02 NOTE — TELEPHONE ENCOUNTER
Halle Pardo is calling to request a refill on the following medication(s):    Last Visit Date (If Applicable):  1/30/5390    Next Visit Date:    10/9/2023    Medication Request:  Requested Prescriptions     Pending Prescriptions Disp Refills    atenolol (TENORMIN) 25 MG tablet [Pharmacy Med Name: ATENOLOL 25 MG TABLET] 60 tablet 5     Sig: TAKE ONE TABLET BY MOUTH TWICE A DAY

## 2023-05-24 ENCOUNTER — OFFICE VISIT (OUTPATIENT)
Dept: FAMILY MEDICINE CLINIC | Age: 73
End: 2023-05-24
Payer: MEDICARE

## 2023-05-24 VITALS
WEIGHT: 143 LBS | BODY MASS INDEX: 26.31 KG/M2 | HEART RATE: 67 BPM | HEIGHT: 62 IN | DIASTOLIC BLOOD PRESSURE: 79 MMHG | SYSTOLIC BLOOD PRESSURE: 122 MMHG

## 2023-05-24 DIAGNOSIS — J34.89 SORE IN NOSE: Primary | ICD-10-CM

## 2023-05-24 PROCEDURE — 3017F COLORECTAL CA SCREEN DOC REV: CPT | Performed by: NURSE PRACTITIONER

## 2023-05-24 PROCEDURE — G8427 DOCREV CUR MEDS BY ELIG CLIN: HCPCS | Performed by: NURSE PRACTITIONER

## 2023-05-24 PROCEDURE — 1036F TOBACCO NON-USER: CPT | Performed by: NURSE PRACTITIONER

## 2023-05-24 PROCEDURE — 3074F SYST BP LT 130 MM HG: CPT | Performed by: NURSE PRACTITIONER

## 2023-05-24 PROCEDURE — 1123F ACP DISCUSS/DSCN MKR DOCD: CPT | Performed by: NURSE PRACTITIONER

## 2023-05-24 PROCEDURE — 3078F DIAST BP <80 MM HG: CPT | Performed by: NURSE PRACTITIONER

## 2023-05-24 PROCEDURE — G8400 PT W/DXA NO RESULTS DOC: HCPCS | Performed by: NURSE PRACTITIONER

## 2023-05-24 PROCEDURE — 1090F PRES/ABSN URINE INCON ASSESS: CPT | Performed by: NURSE PRACTITIONER

## 2023-05-24 PROCEDURE — 99213 OFFICE O/P EST LOW 20 MIN: CPT | Performed by: NURSE PRACTITIONER

## 2023-05-24 PROCEDURE — G8417 CALC BMI ABV UP PARAM F/U: HCPCS | Performed by: NURSE PRACTITIONER

## 2023-05-24 RX ORDER — AMOXICILLIN AND CLAVULANATE POTASSIUM 875; 125 MG/1; MG/1
1 TABLET, FILM COATED ORAL 2 TIMES DAILY
Qty: 20 TABLET | Refills: 0 | Status: SHIPPED | OUTPATIENT
Start: 2023-05-24 | End: 2023-06-03

## 2023-05-24 ASSESSMENT — ENCOUNTER SYMPTOMS
COLOR CHANGE: 1
ABDOMINAL PAIN: 0
SINUS PAIN: 0
CHEST TIGHTNESS: 0
CONSTIPATION: 0
DIARRHEA: 0
SINUS PRESSURE: 0
SHORTNESS OF BREATH: 0

## 2023-05-24 ASSESSMENT — PATIENT HEALTH QUESTIONNAIRE - PHQ9
SUM OF ALL RESPONSES TO PHQ QUESTIONS 1-9: 0
SUM OF ALL RESPONSES TO PHQ QUESTIONS 1-9: 0
1. LITTLE INTEREST OR PLEASURE IN DOING THINGS: 0
SUM OF ALL RESPONSES TO PHQ QUESTIONS 1-9: 0
2. FEELING DOWN, DEPRESSED OR HOPELESS: 0
SUM OF ALL RESPONSES TO PHQ QUESTIONS 1-9: 0
SUM OF ALL RESPONSES TO PHQ9 QUESTIONS 1 & 2: 0

## 2023-05-24 NOTE — PROGRESS NOTES
Derick Salinas is a 75 y.o. female who presents in office today with Self follow up on recent condition of     Chief Complaint   Patient presents with    Sore     On inside of left nostril x 1 week, has tried Bactroban, but no help  Has had this before and was given cream that didn't help and then atb, which did help    Health Maintenance     Patient has refused all Covid vaccines. History of Present Illness:     HPI    Here with concern for sore inside left nostril for the last 5-6 days. Having tenderness and erythema. Has not noticed any drainage. Recalls she has something like this before and was given oral antibiotic, which resolved it completely. She has tried Bactroban but has not helped. Care gaps: COVID-19 vaccine:  Colon CA screening:  Vaccines:   Hepatitis C/HIV screens:   Breast CA screening:  had done in 2023  OB/GYN, cervical CA screening:  Depression Screenin    Health Maintenance Due   Topic Date Due    COVID-19 Vaccine (1) Never done    Shingles vaccine (1 of 2) Never done    DEXA (modify frequency per FRAX score)  Never done    Pneumococcal 65+ years Vaccine (1 - PCV) Never done    Breast cancer screen  2022        Patient Care Team:  Radha Neri MD as PCP - General (Family Medicine)  Radha Neri MD as PCP - Empaneled Provider    Reviewed     [x] Past Medical, Family, and Social History was reviewed per writer and does contribute to the patient presenting condition.     [x] Laboratory Results, Vital signs, Imaging, Active Problems, Immunizations, Current/Recently Discontinued Medications, Health Maintenance Activities Due, Referral Notes (if available) were reviewed per writer     [x] Reviewed Depression screening if taken or valid today or any other valid screening tool (others seen below) Interpretation of Total Score DepressionSeverity: 1-4 = Minimal depression, 5-9 = Mild depression, 10-14 = Moderate depression, 15-19 = Moderately severe

## 2023-05-26 NOTE — ASSESSMENT & PLAN NOTE
"    Psychiatric Discharge Summary    Hospital Day #47    Sophie Steen MRN# [id number]   Age: 25 year old YOB: 1997     Date of Admission:  4/15/2023  Date of Discharge:  6/1/2023 11:47 AM  Admitting Physician:  Johanna Altman MD  Discharge Physician:  Johanna Altman MD         Event Leading to Hospitalization:   History obtained from patient and electronic chart     Ki Ion Steen is a 25 year old male (FTM) with a known psychiatric diagnosis of schizoaffective disorder, bipolar type admitted from the ED on 04/15/2023 due to psychosis and álvaro in the context of recent hospitalization 3/26-4/11/23 for psychosis and álvaro.     Per [facility] Note:  \"Summary of Patient Situation & Collateral  The patient has a history of schizophrenia and was admitted to [facility] from 3/26/2023 to 4/11/2023 for psychosis. He was committed with a Holman order. He was provisionally discharged on 4/11/2023 to the [facility] with homeless shelter resources and a plan to drive to home in [place], [place]. His medicines were sent to a pharmacy in [place], [place]. He was assigned a , Karyna Thakkar, from [facility]3], [phone].      The patient left [facility] on 4/11/2023 and presented to [facility] ED the next morning (4/12/2023) asking that his medications be filled in [place]. ED staff was able to fill them. He told staff he now plans to get an apartment in [place].      He left the [facility] ED and shortly after that (early afternoon on 4/12/2023) he called 911 from the side of the road and requested to be transported to a hospital for psychosis. Staff notes state he appeared delusional and paranoid and unable to care for himself. 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      Called patient's mother, Kal, [phone], who doesn't drive and is not able to come and get him. She states when he is at her home he \"barely talks,\" he \"talks to himself a lot,\" and she doesn't know how to help him with his mental health needs. When asked if has a history of aggression she was not able to give a clear answer.      Per patient report on admission:    \"Tello Steen was interviewed in his bedroom. He reports that since his recent hospital discharge, he has been feeling well and denies experiencing any MH symptoms. He's not sure why he's in the hospital right now, but feels \"clear\" and ready to leave as soon as possible. He described being awake for 40 hours while and getting a flat tire while traveling to [place], and considers these as the main reasons he's in the hospital. He denied being told to come to the hospital by a chip in his brain. Denied feeling lack of need for sleep. He plans to return to [place] where he reports having housing planned. He reported being very intelligent and some type of niche . 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 Denies recent substance use.     Later in the conversation, he got very close to writer's laptop to introduce himself to the virtual attending. He states \"just so you know, there really is a chip in my brain.\" He's not sure who would have put it there. He denies it being bothersome, stating happily \"I love talking to it.\"      Patient's primary goal for this hospitalization is to discharge back to [place] as soon as possible.\"      See Admission note by Johanna Altman MD on 4/15 for additional details.          Diagnoses:   #Primary Psychiatric Diagnosis  - Schizoaffective disorder, bipolar type    Diagnostic Impression:   Tello Steen is a 25 year old male with a known psychiatric diagnosis of schizoaffecctive disorder bipolar type admitted from the ED on 04/15/2023 due to psychosis and álvaro in " the context of recent hospitalization 3/26-4/11/23 for psychosis and álvaro. Significant symptoms on admission include delusions about a chip in their brain, delusions of grandeur, delusions of having a special mission with the [employer], elevated mood, and insomnia. The MSE on admission was pertinent for elevated affect and grandiose delusions. Biological contributions to mental health presentation include previous psychiatric diagnosis of schizoaffective disorder. On chart review, there are also mentions of Lithium trials, possibly indicating previous álvaro. Psychosocial contributions to mental health presentation include partial insight, unclear work, housing, and financial situation. Protective factors include medication adherence, lack of substance use, and supportive CCM, good frustration tolerance, and presence of perceived social supports.      In summary, the patient's current presentation and history of psychosis and álvaro  are consistent with schizoaffective disorder bipolar type.          Consults:   Medicine consulted for clozapine induced tachycardia:   - Agreed with addition of Atenolol          Hospital Course:   Psychiatric Course:  Sophie Steen was admitted to [facility] 20/32  with attending Johanna Altman MD and his provisional discharge was revoked.  His existing holman was amended to add clozapine given patients long hisotry of failed neuroleptic trials.  While awaiting clozapine to be added to the Holman, PTA olanzapine was continued.  Patient developed arm dystonia on olanzapine and therefore olanzapine was cross-titrated to risperidone. Lithium was initiated to target álvaro and titrated to 1,200mg at bedtime.  When Clozapine was added to Holman, Risperidone was cross-titrated to Clozapine.  Clozapine was slowly titrated to 300mg.   Patient developed clozapine-induced tachycardia, which is quite common, and atenolol was added and increased to 25mg which effectively treated tachycardia. EKGs  "showed no arythmia or qt prolongation.  Patient's symptoms of insomnia, elevated mood, and grandiose delusions significantly improved throughout hospitalization.  At time of discharge he continued to believe there was a chip in his head, however he was able to ignore messages from the chip and reported being able to make his own decisions.  This may be his baseline.  Given patient had [place] insurance we were unable to set up outpatient services here in [place] and he was discharged home to [place] with his family.  Patient is planning to present to walk-in clinic on Monday where he will establish care to get medications managed and get weekly CBCs for clozapine.     Risk Assessment:      Today Sophie Steen denies SI, SIB, HI. Patient has notable risk factors for self-harm, including age, single status and psychosis. However, risk is mitigated by commitment to family, sobriety, ability to volunteer a safety plan and history of seeking help when needed. Therefore, based on all available evidence including the factors cited above, he does not appear to be at imminent risk for self-harm, does not meet criteria for a 72-hr hold, and therefore remains appropriate for ongoing outpatient level of care. Additional steps taken to minimize risk include: medication optimization, close psychiatric follow up and provision of crisis resources in [place].  Unfortunately there is no adequate screening for a patient's ability to drive.  The patient's judgement at the time of discharge was good.  He stated that if the chip in his head told him to drive of the road he would be able to \"say no\" and not act on it.  He stated that he felt safe driving home and that if he ever felt unsafe he would call a friend, family member, the police, or present to a hospital.  When patient was in an unsafe driving situation prior to this hospitalization, he had a history of seeking appropriate help.     Sophie was released to home. During this " admission, he did not generally participate in groups and was usually isolative and visible in the milieu, though his symptoms of psychosis improved. At the time of discharge he was determined to not be a danger to himself or others.     Medical course: Patient was physically examined by the ED prior to being transferred to the unit and was found to be medically stable and appropriate for admission. Patient has been intermittently found to be orthostatic and with postural tachycardia since 5/2 but has remained asymptomatic. Encouraged fluids and started atenolol with positive effect.     This document serves as a transfer of care to Sophie Steen's outpatient providers.         Discharge Medications:     Discharge Medication List as of 6/1/2023 11:06 AM      START taking these medications    Details   atenolol (TENORMIN) 25 MG tablet Take 1 tablet (25 mg) by mouth daily for 30 days, Disp-30 tablet, R-0, E-Prescribe      clotrimazole (LOTRIMIN) 1 % external cream Apply topically 2 times dailyDisp-40 g, [id number]      cloZAPine (CLOZARIL) 100 MG tablet Take 3 tablets (300 mg) by mouth At Bedtime for 7 days, Disp-21 tablet, R-0, E-Prescribe      ipratropium (ATROVENT) 0.03 % nasal spray Place 2 sprays under the tongue nightly as needed for other (as needed for sialorrhea), Disp-30 mL, R-0, E-Prescribe      lithium ER (LITHOBID) 300 MG CR tablet Take 4 tablets (1,200 mg) by mouth At Bedtime, Disp-30 tablet, R-0, E-Prescribe         CONTINUE these medications which have CHANGED    Details   Vitamin D3 (CHOLECALCIFEROL) 25 mcg (1000 units) tablet Take 1 tablet (25 mcg) by mouth daily, Disp-30 tablet, R-0, E-Prescribe         STOP taking these medications       OLANZapine (ZYPREXA) 10 MG tablet Comments:   Reason for Stopping:         OLANZapine (ZYPREXA) 5 MG tablet Comments:   Reason for Stopping:         paliperidone (INVEGA SUSTENNA) 234 MG/1.5ML JOSE Comments:   Reason for Stopping:         propranolol  "(INDERAL) 10 MG tablet Comments:   Reason for Stopping:                    Psychiatric Examination:     Mental Status Exam:  Oriented to:  Grossly Oriented, Person/Self, Situation and State  General:  Awake and Alert  Appearance:  appears stated age  Behavior/Attitude:  calm, cooperative and engaged  Eye Contact:  appropriate  Psychomotor: normal and no evidence of tics, dystonia, or tardive dyskinesia no catatonia present  Speech:  appropriate volume/tone  Language: Fluent in English with appropriate syntax and vocabulary.  Mood:  \"good\"  Affect:  appropriate  Thought Process:  linear, coherent and goal directed  Thought Content:  No SI/HI/AH/VH; If pressed, will mention a chip in his brain, is able to ignore chip  Associations:  intact  Insight:  fair due to continuing to believe there is a chip in his brain  Judgment:  good due to able to ignore the chip and make appropriate decisions when presented with a safe and unsafe scenerio   Impulse control: good  Attention Span:  grossly intact  Concentration:  grossly intact  Recent and Remote Memory:  not formally assessed and grossly intact  Fund of Knowledge:  above average  Muscle Strength and Tone: normal  Gait and Station: Normal         Discharge Plan:   Health Care Follow-up:     Psychiatry Appointment:  [facility]. PLease go to the Clinic on Monday 6/5/23 at 8:00am  You will need to establish a provider to prescribe your Clozapine and order your weekly CBC blood draws so you can continue receiving your clozapine prescriptions.  Please have your provider call [phone] if they have questions.   [address] [place] [address]  Phone: [phone]  Fax: [phone]  Walk ins  Monday/Wednesday/Thursday/Friday, 8:00 a.m. - 2:30 p.m.  Tuesday, 10:00 a.m. - 6:00 p.m.    Major Treatments, Procedures and Findings:   Medications were  managed throughout your stay. An internal medicine consult was completed during your stay. You had the opportunity to " participate in treatment programming while on the unit including occupational therapy, mental health support and education and spiritual services.     Symptoms to Report:   Please report if you are experiencing increased aggression and/or confusion, problematic loss of sleep, worsening mood, or thoughts of suicide to your treatment team or notify your primary provider.   IF THE SYMPTOMS YOU ARE EXPERIENCING ARE A MEDICAL EMERGENCY, CALL 911 IMMEDIATELY    Lifestyle Adjustment:   1. Adjust your lifestyle to get enough sleep, relaxation, exercise and good nutrition.  Continue to develop healthy coping skills to decrease stress and promote a healthy and sober lifestyle.  2. Abstain from all substances of abuse.  3. Take medications as prescribed.  Please work with your doctor to discuss any concerns you have with your medications or side effects you may be experiencing.  4. Follow up with appointments as scheduled.      Resources:   For non-emergency mental health needs, visit behavioral health urgent care or contact the Resource & Referral Line at [phone].    24-Hour Crisis Line [phone])    The 24-hour crisis line is available to anyone experiencing a mental health crisis. All calls are free, confidential, and have a trained professional on the other end.  23-Hour Crisis Observation ([address])    Crisis Observation is available to any adult over 18 years of age who is experiencing psychological or psychiatric stress that does not require hospitalization.  Mobile Crisis (Call 919)    These teams are staffed with professionals who are trained to help anyone in crisis, from children and teenagers to adults and can assist law enforcement on mental health-related emergency calls.  Community-Based Crisis Stabilization (Call [phone])    Community Based Crisis Stabilization provides supports to individuals and families in their own home with therapy and skill building services designed to meet the  individual s immediate needs.  Residential-Based Crisis Stabilization (For children, call [phone], for adults, go to 23-Hour Crisis Observation ([address]))    Residential Based Crisis Stabilization provides a safe space outside of the home for individuals and families to access therapy and skill building services designed to meet the individual s immediate needs.    Your [facility] is the [facility] crisis line that provides information and referral, counseling, crisis service coordination and linkages to crisis screening and mental health services, 24 hours a day, 7 days a week. Call [phone], text [phone] or chat at [email or web address].    General Medication Instructions:   See your medication sheet(s) for instructions.   Take all medicines as directed.  Make no changes unless your doctor suggests them.   Go to all your doctor visits.  Be sure to have all your required lab tests. This way, your medicines can be refilled on time.  Do not use any drugs not prescribed by your doctor.    Johanna Altman DO  Attending Psychiatrist  [facility]           Appendix A: All Labs This Admission:     Results for orders placed or performed during the hospital encounter of 04/15/23   MR Brain w/o Contrast     Status: None    Narrative    MR BRAIN W/O CONTRAST 4/18/2023 11:01 PM    Provided History: First Episode Psychosis workup    ICD-10: Psychosis    Comparison:  None.     Technique: Sagittal T1-weighted and axial T2-weighted, turboFLAIR and  diffusion-weighted with ADC map images of the brain were obtained  without intravenous contrast.    Findings: These images reveal no intracranial mass lesion, mass  effect, midline shift or abnormal extraaxial fluid collection. The  ventricles and sulci are normal for age. Diffusion-weighted images  demonstrate no abnormality of reduced diffusion.  Normal intravascular  flow voids are identified. No abnormal signal on susceptibility  weighted  imaging.    No abnormal bone marrow signal. Polypoid mucosal thickening of the  left maxillary sinus. Mastoid air cells are clear. Orbits are grossly  unremarkable.      Impression    Impression:   Normal brain MRI.         I have personally reviewed the examination and initial interpretation  and I agree with the findings.    ROSE MARIE CORNELL MD         SYSTEM ID:  [id number]   UA reflex to Microscopic and Culture     Status: Abnormal    Specimen: Urine, Clean Catch   Result Value Ref Range    Color Urine Yellow Colorless, Straw, Light Yellow, Yellow    Appearance Urine Slightly Cloudy (A) Clear    Glucose Urine Negative Negative mg/dL    Bilirubin Urine Negative Negative    Ketones Urine Negative Negative mg/dL    Specific Gravity Urine 1.023 1.003 - 1.035    Blood Urine Negative Negative    pH Urine 8.0 (H) 5.0 - 7.0    Protein Albumin Urine 10 (A) Negative mg/dL    Urobilinogen Urine Normal Normal, 2.0 mg/dL    Nitrite Urine Negative Negative    Leukocyte Esterase Urine Negative Negative    Bacteria Urine Few (A) None Seen /HPF    Amorphous Crystals Urine Few (A) None Seen /HPF    RBC Urine 0 <=2 /HPF    WBC Urine 0 <=5 /HPF    Narrative    Urine Culture not indicated   Vitamin B12     Status: Normal   Result Value Ref Range    Vitamin B12 753 232 - 1,245 pg/mL   Folate     Status: Normal   Result Value Ref Range    Folic Acid 12.7 4.6 - 34.8 ng/mL   Treponema Abs w Reflex to RPR and Titer     Status: Normal   Result Value Ref Range    Treponema Antibody Total Nonreactive Nonreactive   Lyme Disease Total Abs Bld with Reflex to Confirm CLIA     Status: Normal   Result Value Ref Range    Lyme Disease Antibodies Total 0.14 <0.90   Erythrocyte sedimentation rate auto     Status: Normal   Result Value Ref Range    Erythrocyte Sedimentation Rate 2 0 - 15 mm/hr   Anti Nuclear Anisa IgG by IFA with Reflex     Status: Normal   Result Value Ref Range    AILYN interpretation Negative Negative   Ceruloplasmin     Status: Normal    Result Value Ref Range    Ceruloplasmin 20 20 - 60 mg/dL   HIV Antigen Antibody Combo     Status: Normal   Result Value Ref Range    HIV Antigen Antibody Combo Nonreactive Nonreactive   Lithium level     Status: Normal   Result Value Ref Range    Lithium 0.6 0.6 - 1.2 mmol/L   Creatinine     Status: Normal   Result Value Ref Range    Creatinine 0.84 0.67 - 1.17 mg/dL    GFR Estimate >90 >60 mL/min/1.73m2   CBC with platelets and differential     Status: None   Result Value Ref Range    WBC Count 7.2 4.0 - 11.0 10e3/uL    RBC Count 4.69 4.40 - 5.90 10e6/uL    Hemoglobin 14.2 13.3 - 17.7 g/dL    Hematocrit 42.4 40.0 - 53.0 %    MCV 90 78 - 100 fL    MCH 30.3 26.5 - 33.0 pg    MCHC 33.5 31.5 - 36.5 g/dL    RDW 12.6 10.0 - 15.0 %    Platelet Count 187 150 - 450 10e3/uL    % Neutrophils 57 %    % Lymphocytes 26 %    % Monocytes 13 %    % Eosinophils 3 %    % Basophils 1 %    % Immature Granulocytes 0 %    NRBCs per 100 WBC 0 <1 /100    Absolute Neutrophils 4.0 1.6 - 8.3 10e3/uL    Absolute Lymphocytes 1.9 0.8 - 5.3 10e3/uL    Absolute Monocytes 1.0 0.0 - 1.3 10e3/uL    Absolute Eosinophils 0.2 0.0 - 0.7 10e3/uL    Absolute Basophils 0.1 0.0 - 0.2 10e3/uL    Absolute Immature Granulocytes 0.0 <=0.4 10e3/uL    Absolute NRBCs 0.0 10e3/uL   Lithium level     Status: Normal   Result Value Ref Range    Lithium 0.6 0.6 - 1.2 mmol/L   Creatinine     Status: Normal   Result Value Ref Range    Creatinine 0.75 0.67 - 1.17 mg/dL    GFR Estimate >90 >60 mL/min/1.73m2   CBC with platelets and differential     Status: None   Result Value Ref Range    WBC Count 7.1 4.0 - 11.0 10e3/uL    RBC Count 4.98 4.40 - 5.90 10e6/uL    Hemoglobin 14.9 13.3 - 17.7 g/dL    Hematocrit 44.4 40.0 - 53.0 %    MCV 89 78 - 100 fL    MCH 29.9 26.5 - 33.0 pg    MCHC 33.6 31.5 - 36.5 g/dL    RDW 12.6 10.0 - 15.0 %    Platelet Count 195 150 - 450 10e3/uL    % Neutrophils 63 %    % Lymphocytes 20 %    % Monocytes 12 %    % Eosinophils 4 %    % Basophils 1 %     % Immature Granulocytes 0 %    NRBCs per 100 WBC 0 <1 /100    Absolute Neutrophils 4.5 1.6 - 8.3 10e3/uL    Absolute Lymphocytes 1.4 0.8 - 5.3 10e3/uL    Absolute Monocytes 0.9 0.0 - 1.3 10e3/uL    Absolute Eosinophils 0.3 0.0 - 0.7 10e3/uL    Absolute Basophils 0.1 0.0 - 0.2 10e3/uL    Absolute Immature Granulocytes 0.0 <=0.4 10e3/uL    Absolute NRBCs 0.0 10e3/uL   Lithium level     Status: Normal   Result Value Ref Range    Lithium 0.9 0.6 - 1.2 mmol/L   CBC with platelets and differential     Status: None   Result Value Ref Range    WBC Count 8.8 4.0 - 11.0 10e3/uL    RBC Count 5.18 4.40 - 5.90 10e6/uL    Hemoglobin 15.8 13.3 - 17.7 g/dL    Hematocrit 47.1 40.0 - 53.0 %    MCV 91 78 - 100 fL    MCH 30.5 26.5 - 33.0 pg    MCHC 33.5 31.5 - 36.5 g/dL    RDW 12.8 10.0 - 15.0 %    Platelet Count 241 150 - 450 10e3/uL    % Neutrophils 60 %    % Lymphocytes 21 %    % Monocytes 11 %    % Eosinophils 6 %    % Basophils 1 %    % Immature Granulocytes 1 %    NRBCs per 100 WBC 0 <1 /100    Absolute Neutrophils 5.3 1.6 - 8.3 10e3/uL    Absolute Lymphocytes 1.8 0.8 - 5.3 10e3/uL    Absolute Monocytes 1.0 0.0 - 1.3 10e3/uL    Absolute Eosinophils 0.5 0.0 - 0.7 10e3/uL    Absolute Basophils 0.1 0.0 - 0.2 10e3/uL    Absolute Immature Granulocytes 0.1 <=0.4 10e3/uL    Absolute NRBCs 0.0 10e3/uL   Uric acid     Status: Normal   Result Value Ref Range    Uric Acid 6.4 3.4 - 7.0 mg/dL   Clozapine and Metabolites Quant     Status: None   Result Value Ref Range    Clozapine Quant 122 ng/mL    Norclozapine Quant <100 ng/mL    Clozapine-N-Oxide Quant <100 ng/mL    Clozapine and Metabolites Total 122 <=1500 ng/mL   CBC with platelets and differential     Status: None   Result Value Ref Range    WBC Count 6.8 4.0 - 11.0 10e3/uL    RBC Count 4.68 4.40 - 5.90 10e6/uL    Hemoglobin 14.2 13.3 - 17.7 g/dL    Hematocrit 42.3 40.0 - 53.0 %    MCV 90 78 - 100 fL    MCH 30.3 26.5 - 33.0 pg    MCHC 33.6 31.5 - 36.5 g/dL    RDW 12.7 10.0 - 15.0 %     Platelet Count 229 150 - 450 10e3/uL    % Neutrophils 60 %    % Lymphocytes 20 %    % Monocytes 12 %    % Eosinophils 6 %    % Basophils 1 %    % Immature Granulocytes 1 %    NRBCs per 100 WBC 0 <1 /100    Absolute Neutrophils 4.1 1.6 - 8.3 10e3/uL    Absolute Lymphocytes 1.4 0.8 - 5.3 10e3/uL    Absolute Monocytes 0.8 0.0 - 1.3 10e3/uL    Absolute Eosinophils 0.4 0.0 - 0.7 10e3/uL    Absolute Basophils 0.1 0.0 - 0.2 10e3/uL    Absolute Immature Granulocytes 0.1 <=0.4 10e3/uL    Absolute NRBCs 0.0 10e3/uL   CBC with platelets     Status: Normal   Result Value Ref Range    WBC Count 8.5 4.0 - 11.0 10e3/uL    RBC Count 4.80 4.40 - 5.90 10e6/uL    Hemoglobin 14.6 13.3 - 17.7 g/dL    Hematocrit 43.4 40.0 - 53.0 %    MCV 90 78 - 100 fL    MCH 30.4 26.5 - 33.0 pg    MCHC 33.6 31.5 - 36.5 g/dL    RDW 12.5 10.0 - 15.0 %    Platelet Count 227 150 - 450 10e3/uL   Basic metabolic panel     Status: Abnormal   Result Value Ref Range    Sodium 140 136 - 145 mmol/L    Potassium 4.1 3.4 - 5.3 mmol/L    Chloride 101 98 - 107 mmol/L    Carbon Dioxide (CO2) 28 22 - 29 mmol/L    Anion Gap 11 7 - 15 mmol/L    Urea Nitrogen 15.8 6.0 - 20.0 mg/dL    Creatinine 0.83 0.67 - 1.17 mg/dL    Calcium 10.2 (H) 8.6 - 10.0 mg/dL    Glucose 116 (H) 70 - 99 mg/dL    GFR Estimate >90 >60 mL/min/1.73m2   Magnesium     Status: Normal   Result Value Ref Range    Magnesium 2.1 1.7 - 2.3 mg/dL   CBC with platelets and differential     Status: None   Result Value Ref Range    WBC Count 7.2 4.0 - 11.0 10e3/uL    RBC Count 4.57 4.40 - 5.90 10e6/uL    Hemoglobin 13.7 13.3 - 17.7 g/dL    Hematocrit 42.2 40.0 - 53.0 %    MCV 92 78 - 100 fL    MCH 30.0 26.5 - 33.0 pg    MCHC 32.5 31.5 - 36.5 g/dL    RDW 12.7 10.0 - 15.0 %    Platelet Count 204 150 - 450 10e3/uL    % Neutrophils 57 %    % Lymphocytes 23 %    % Monocytes 12 %    % Eosinophils 6 %    % Basophils 1 %    % Immature Granulocytes 1 %    NRBCs per 100 WBC 0 <1 /100    Absolute Neutrophils 4.1 1.6 -  8.3 10e3/uL    Absolute Lymphocytes 1.6 0.8 - 5.3 10e3/uL    Absolute Monocytes 0.9 0.0 - 1.3 10e3/uL    Absolute Eosinophils 0.4 0.0 - 0.7 10e3/uL    Absolute Basophils 0.1 0.0 - 0.2 10e3/uL    Absolute Immature Granulocytes 0.1 <=0.4 10e3/uL    Absolute NRBCs 0.0 10e3/uL   Extra Tube     Status: None    Narrative    The following orders were created for panel order Extra Tube.  Procedure                               Abnormality         Status                     ---------                               -----------         ------                     Extra Green Top (Lithium...[[id number]]                      Final result                 Please view results for these tests on the individual orders.   Extra Green Top (Lithium Heparin) Tube     Status: None   Result Value Ref Range    Hold Specimen JIC    CBC with platelets and differential     Status: None   Result Value Ref Range    WBC Count 9.3 4.0 - 11.0 10e3/uL    RBC Count 4.69 4.40 - 5.90 10e6/uL    Hemoglobin 14.3 13.3 - 17.7 g/dL    Hematocrit 42.1 40.0 - 53.0 %    MCV 90 78 - 100 fL    MCH 30.5 26.5 - 33.0 pg    MCHC 34.0 31.5 - 36.5 g/dL    RDW 12.5 10.0 - 15.0 %    Platelet Count 230 150 - 450 10e3/uL    % Neutrophils 64 %    % Lymphocytes 21 %    % Monocytes 9 %    % Eosinophils 4 %    % Basophils 1 %    % Immature Granulocytes 1 %    NRBCs per 100 WBC 0 <1 /100    Absolute Neutrophils 6.1 1.6 - 8.3 10e3/uL    Absolute Lymphocytes 2.0 0.8 - 5.3 10e3/uL    Absolute Monocytes 0.8 0.0 - 1.3 10e3/uL    Absolute Eosinophils 0.4 0.0 - 0.7 10e3/uL    Absolute Basophils 0.1 0.0 - 0.2 10e3/uL    Absolute Immature Granulocytes 0.1 <=0.4 10e3/uL    Absolute NRBCs 0.0 10e3/uL   EKG 12-lead, complete     Status: None   Result Value Ref Range    Systolic Blood Pressure  mmHg    Diastolic Blood Pressure  mmHg    Ventricular Rate 78 BPM    Atrial Rate 78 BPM    LA Interval 148 ms    QRS Duration 86 ms     ms    QTc 417 ms    P Axis 70 degrees    R AXIS 96 degrees     T Axis 77 degrees    Interpretation ECG       Sinus rhythm  Rightward axis  Early repolarization  Borderline ECG  When compared with ECG of 20-MAR-2023 15:22,  No significant change was found  Confirmed by fellow MD TERESA, ALECIA ([id number]) on 4/27/2023 3:42:02 PM  Confirmed by MD MCKEON HENRI ([id number]) on 4/27/2023 5:59:00 PM     EKG 12-lead, complete     Status: None   Result Value Ref Range    Systolic Blood Pressure  mmHg    Diastolic Blood Pressure  mmHg    Ventricular Rate 117 BPM    Atrial Rate 117 BPM    UT Interval 144 ms    QRS Duration 84 ms     ms    QTc 463 ms    P Axis 70 degrees    R AXIS 96 degrees    T Axis 60 degrees    Interpretation ECG       Sinus tachycardia  Possible Left atrial enlargement  Rightward axis  Borderline ECG  When compared with ECG of 26-APR-2023 13:32,  Vent. rate has increased BY  39 BPM  Confirmed by MD MCKEON HENRI ([id number]) on 5/14/2023 11:29:01 PM     EKG 12-lead, complete     Status: None   Result Value Ref Range    Systolic Blood Pressure  mmHg    Diastolic Blood Pressure  mmHg    Ventricular Rate 100 BPM    Atrial Rate 100 BPM    UT Interval 148 ms    QRS Duration 80 ms     ms    QTc 433 ms    P Axis 65 degrees    R AXIS 96 degrees    T Axis 56 degrees    Interpretation ECG       Sinus rhythm  Rightward axis  Early repolarization  Borderline ECG  When compared with ECG of 12-MAY-2023 10:37,  No significant change was found  Confirmed by MD ALMAS, IVELISSE ([id number]) on 5/23/2023 4:12:23 PM     EKG 12-lead, complete     Status: None   Result Value Ref Range    Systolic Blood Pressure  mmHg    Diastolic Blood Pressure  mmHg    Ventricular Rate 87 BPM    Atrial Rate 87 BPM    UT Interval 150 ms    QRS Duration 84 ms     ms    QTc 421 ms    P Axis 58 degrees    R AXIS 91 degrees    T Axis 63 degrees    Interpretation ECG       Sinus rhythm  Rightward axis  Early repolarization  Borderline ECG  When compared with ECG of 22-MAY-2023 13:42,  No significant change was  found  Confirmed by Angela Wheatley ([id number]) on 6/1/2023 8:05:54 AM     CBC with Platelets & Differential     Status: None    Narrative    The following orders were created for panel order CBC with Platelets & Differential.  Procedure                               Abnormality         Status                     ---------                               -----------         ------                     CBC with platelets and d...[[id number]]                      Final result                 Please view results for these tests on the individual orders.   CBC with platelets differential *Canceled*     Status: None ()    Narrative    The following orders were created for panel order CBC with platelets differential.  Procedure                               Abnormality         Status                     ---------                               -----------         ------                       Please view results for these tests on the individual orders.   CBC with platelets differential     Status: None    Narrative    The following orders were created for panel order CBC with platelets differential.  Procedure                               Abnormality         Status                     ---------                               -----------         ------                     CBC with platelets and d...[[id number]]                      Final result                 Please view results for these tests on the individual orders.   CBC with platelets differential     Status: None    Narrative    The following orders were created for panel order CBC with platelets differential.  Procedure                               Abnormality         Status                     ---------                               -----------         ------                     CBC with platelets and d...[[id number]]                      Final result                 Please view results for these tests on the individual orders.   CBC with platelets differential     Status:  None    Narrative    The following orders were created for panel order CBC with platelets differential.  Procedure                               Abnormality         Status                     ---------                               -----------         ------                     CBC with platelets and d...[[id number]]                      Final result                 Please view results for these tests on the individual orders.   CBC with platelets differential     Status: None    Narrative    The following orders were created for panel order CBC with platelets differential.  Procedure                               Abnormality         Status                     ---------                               -----------         ------                     CBC with platelets and d...[[id number]]                      Final result                 Please view results for these tests on the individual orders.   CBC with platelets differential     Status: None    Narrative    The following orders were created for panel order CBC with platelets differential.  Procedure                               Abnormality         Status                     ---------                               -----------         ------                     CBC with platelets and d...[[id number]]                      Final result                 Please view results for these tests on the individual orders.

## 2023-06-02 ENCOUNTER — TELEPHONE (OUTPATIENT)
Dept: FAMILY MEDICINE CLINIC | Age: 73
End: 2023-06-02

## 2023-06-02 RX ORDER — FLUCONAZOLE 150 MG/1
150 TABLET ORAL
Qty: 2 TABLET | Refills: 1 | Status: SHIPPED | OUTPATIENT
Start: 2023-06-02

## 2023-08-01 ENCOUNTER — TELEMEDICINE (OUTPATIENT)
Dept: FAMILY MEDICINE CLINIC | Age: 73
End: 2023-08-01
Payer: MEDICARE

## 2023-08-01 DIAGNOSIS — R19.7 DIARRHEA, UNSPECIFIED TYPE: Primary | ICD-10-CM

## 2023-08-01 PROCEDURE — 1090F PRES/ABSN URINE INCON ASSESS: CPT | Performed by: FAMILY MEDICINE

## 2023-08-01 PROCEDURE — 1123F ACP DISCUSS/DSCN MKR DOCD: CPT | Performed by: FAMILY MEDICINE

## 2023-08-01 PROCEDURE — G8400 PT W/DXA NO RESULTS DOC: HCPCS | Performed by: FAMILY MEDICINE

## 2023-08-01 PROCEDURE — G8417 CALC BMI ABV UP PARAM F/U: HCPCS | Performed by: FAMILY MEDICINE

## 2023-08-01 PROCEDURE — 1036F TOBACCO NON-USER: CPT | Performed by: FAMILY MEDICINE

## 2023-08-01 PROCEDURE — 3017F COLORECTAL CA SCREEN DOC REV: CPT | Performed by: FAMILY MEDICINE

## 2023-08-01 PROCEDURE — G8427 DOCREV CUR MEDS BY ELIG CLIN: HCPCS | Performed by: FAMILY MEDICINE

## 2023-08-01 PROCEDURE — 99213 OFFICE O/P EST LOW 20 MIN: CPT | Performed by: FAMILY MEDICINE

## 2023-08-01 RX ORDER — SACCHAROMYCES BOULARDII 250 MG
250 CAPSULE ORAL 2 TIMES DAILY
Qty: 28 CAPSULE | Refills: 0 | Status: SHIPPED | OUTPATIENT
Start: 2023-08-01 | End: 2023-08-15

## 2023-08-01 ASSESSMENT — PATIENT HEALTH QUESTIONNAIRE - PHQ9
SUM OF ALL RESPONSES TO PHQ QUESTIONS 1-9: 0
SUM OF ALL RESPONSES TO PHQ9 QUESTIONS 1 & 2: 0
2. FEELING DOWN, DEPRESSED OR HOPELESS: 0
SUM OF ALL RESPONSES TO PHQ QUESTIONS 1-9: 0
1. LITTLE INTEREST OR PLEASURE IN DOING THINGS: 0

## 2023-08-01 NOTE — PROGRESS NOTES
Stephanie Fontanez (:  1950) is a Established patient, presenting virtually for evaluation of the following:    Assessment & Plan   Below is the assessment and plan developed based on review of pertinent history, physical exam, labs, studies, and medications. 1. Diarrhea, unspecified type  -     Clostridium Difficile Toxin/Antigen; Future  -     saccharomyces boulardii (FLORASTOR) 250 MG capsule; Take 1 capsule by mouth 2 times daily for 14 days, Disp-28 capsule, R-0Normal    No follow-ups on file.    She has not lost that she is not anemic she is not having daily consistent diarrhea there is a remote possibility she could have C. difficile  We will have her do a stool analysis for that she needs to start on Florastor  Increase fluids and follow-up if not improving    Subjective   HPI  Review of Systems   Put on Augmentin briefly after that she started developing some intermittent diarrhea she may be fine and then a day or so later she will have diarrhea may be for 5 times in a day she has had a colectomy with reversal had been fine for that for many years  She tried a over-the-counter probiotic but did not have relief she is concerned she has a parasite    Objective   Patient-Reported Vitals  No data recorded     Physical Exam  [INSTRUCTIONS:  \"[x]\" Indicates a positive item  \"[]\" Indicates a negative item  -- DELETE ALL ITEMS NOT EXAMINED]    Constitutional: [x] Appears well-developed and well-nourished [x] No apparent distress      [] Abnormal -     Mental status: [x] Alert and awake  [x] Oriented to person/place/time [x] Able to follow commands    [] Abnormal -     Eyes:   EOM    [x]  Normal    [] Abnormal -   Sclera  [x]  Normal    [] Abnormal -          Discharge [x]  None visible   [] Abnormal -     HENT: [x] Normocephalic, atraumatic  [] Abnormal -   [x] Mouth/Throat: Mucous membranes are moist    External Ears [x] Normal  [] Abnormal -    Neck: [x] No visualized mass [] Abnormal -     Pulmonary/Chest:

## 2023-09-01 ENCOUNTER — TELEPHONE (OUTPATIENT)
Dept: FAMILY MEDICINE CLINIC | Age: 73
End: 2023-09-01

## 2023-10-02 ENCOUNTER — TELEPHONE (OUTPATIENT)
Dept: FAMILY MEDICINE CLINIC | Age: 73
End: 2023-10-02

## 2023-10-02 NOTE — TELEPHONE ENCOUNTER
----- Message from Emily Canales sent at 10/2/2023  2:37 PM EDT -----  Subject: Appointment Request    Reason for Call: Established Patient Appointment needed: Routine Medicare   AWV    QUESTIONS    Reason for appointment request? Available appointments did not meet   patient need     Additional Information for Provider? Zoila Said needed to cancel her appt for   her AWV and needs another.  Please call to let her know what is avail, went   through to Feb.  ---------------------------------------------------------------------------  --------------  [address]  [phone]; OK to leave message on voicemail  ---------------------------------------------------------------------------  --------------  SCRIPT ANSWERS

## 2023-10-03 ENCOUNTER — TELEPHONE (OUTPATIENT)
Dept: FAMILY MEDICINE CLINIC | Age: 73
End: 2023-10-03

## 2023-10-03 NOTE — TELEPHONE ENCOUNTER
----- Message from Madyson Chang sent at 10/2/2023  2:37 PM EDT -----  Subject: Appointment Request    Reason for Call: Established Patient Appointment needed: Routine Medicare   AWV    QUESTIONS    Reason for appointment request? Available appointments did not meet   patient need     Additional Information for Provider? Carolina Painter needed to cancel her appt for   her AWV and needs another.  Please call to let her know what is avail, went   through to Feb.  ---------------------------------------------------------------------------  --------------  Lennie Lincoln Thiago  [phone]; OK to leave message on voicemail  ---------------------------------------------------------------------------  --------------  SCRIPT ANSWERS

## 2023-10-17 ENCOUNTER — OFFICE VISIT (OUTPATIENT)
Dept: FAMILY MEDICINE CLINIC | Age: 73
End: 2023-10-17
Payer: MEDICARE

## 2023-10-17 VITALS
DIASTOLIC BLOOD PRESSURE: 72 MMHG | HEART RATE: 68 BPM | SYSTOLIC BLOOD PRESSURE: 120 MMHG | WEIGHT: 142 LBS | HEIGHT: 62 IN | BODY MASS INDEX: 26.13 KG/M2

## 2023-10-17 DIAGNOSIS — I25.10 CORONARY ARTERY DISEASE INVOLVING NATIVE CORONARY ARTERY OF NATIVE HEART WITHOUT ANGINA PECTORIS: ICD-10-CM

## 2023-10-17 DIAGNOSIS — Z78.0 MENOPAUSE: ICD-10-CM

## 2023-10-17 DIAGNOSIS — Z17.1 MALIGNANT NEOPLASM OF RIGHT BREAST IN FEMALE, ESTROGEN RECEPTOR NEGATIVE, UNSPECIFIED SITE OF BREAST (HCC): ICD-10-CM

## 2023-10-17 DIAGNOSIS — Z00.00 MEDICARE ANNUAL WELLNESS VISIT, SUBSEQUENT: Primary | ICD-10-CM

## 2023-10-17 DIAGNOSIS — I10 ESSENTIAL HYPERTENSION: ICD-10-CM

## 2023-10-17 DIAGNOSIS — C50.911 MALIGNANT NEOPLASM OF RIGHT BREAST IN FEMALE, ESTROGEN RECEPTOR NEGATIVE, UNSPECIFIED SITE OF BREAST (HCC): ICD-10-CM

## 2023-10-17 DIAGNOSIS — Z86.010 HISTORY OF COLON POLYPS: ICD-10-CM

## 2023-10-17 PROBLEM — Z86.0100 HISTORY OF COLON POLYPS: Status: ACTIVE | Noted: 2023-10-17

## 2023-10-17 PROCEDURE — G0439 PPPS, SUBSEQ VISIT: HCPCS | Performed by: FAMILY MEDICINE

## 2023-10-17 PROCEDURE — 3017F COLORECTAL CA SCREEN DOC REV: CPT | Performed by: FAMILY MEDICINE

## 2023-10-17 PROCEDURE — 3078F DIAST BP <80 MM HG: CPT | Performed by: FAMILY MEDICINE

## 2023-10-17 PROCEDURE — 3074F SYST BP LT 130 MM HG: CPT | Performed by: FAMILY MEDICINE

## 2023-10-17 PROCEDURE — 1123F ACP DISCUSS/DSCN MKR DOCD: CPT | Performed by: FAMILY MEDICINE

## 2023-10-17 PROCEDURE — G8484 FLU IMMUNIZE NO ADMIN: HCPCS | Performed by: FAMILY MEDICINE

## 2023-10-17 RX ORDER — HYDROCHLOROTHIAZIDE 25 MG/1
25 TABLET ORAL DAILY
Qty: 90 TABLET | Refills: 3 | Status: SHIPPED | OUTPATIENT
Start: 2023-10-17

## 2023-10-17 RX ORDER — ATENOLOL 25 MG/1
25 TABLET ORAL 2 TIMES DAILY
Qty: 180 TABLET | Refills: 3 | Status: SHIPPED | OUTPATIENT
Start: 2023-10-17

## 2023-10-17 ASSESSMENT — PATIENT HEALTH QUESTIONNAIRE - PHQ9
SUM OF ALL RESPONSES TO PHQ QUESTIONS 1-9: 0
SUM OF ALL RESPONSES TO PHQ QUESTIONS 1-9: 0
SUM OF ALL RESPONSES TO PHQ9 QUESTIONS 1 & 2: 0
SUM OF ALL RESPONSES TO PHQ QUESTIONS 1-9: 0
1. LITTLE INTEREST OR PLEASURE IN DOING THINGS: 0
2. FEELING DOWN, DEPRESSED OR HOPELESS: 0
SUM OF ALL RESPONSES TO PHQ QUESTIONS 1-9: 0

## 2023-10-17 ASSESSMENT — ENCOUNTER SYMPTOMS
SHORTNESS OF BREATH: 0
ALLERGIC/IMMUNOLOGIC NEGATIVE: 1
CONSTIPATION: 0
BLOOD IN STOOL: 0
COUGH: 0
DIARRHEA: 0
ABDOMINAL PAIN: 0
EYES NEGATIVE: 1

## 2023-10-17 ASSESSMENT — LIFESTYLE VARIABLES
HOW MANY STANDARD DRINKS CONTAINING ALCOHOL DO YOU HAVE ON A TYPICAL DAY: PATIENT DOES NOT DRINK
HOW OFTEN DO YOU HAVE A DRINK CONTAINING ALCOHOL: NEVER

## 2023-10-17 NOTE — PROGRESS NOTES
Medicare Annual Wellness Visit    Lenn Fothergill is here for Medicare AWV  AMW. Recheck chronic conditions including cad, colostomy and hx of breast cancer. Assessment & Plan   Medicare annual wellness visit, subsequent  History of colon polyps  Essential hypertension  -     hydroCHLOROthiazide (HYDRODIURIL) 25 MG tablet; Take 1 tablet by mouth daily, Disp-90 tablet, R-3Normal  -     atenolol (TENORMIN) 25 MG tablet; Take 1 tablet by mouth 2 times daily, Disp-180 tablet, R-3Normal  Menopause  -     DEXA BONE DENSITY AXIAL SKELETON; Future  Coronary artery disease involving native coronary artery of native heart without angina pectoris-continue risk reduction and seeing cardiologist  Malignant neoplasm of right breast in female, estrogen receptor negative, unspecified site of breast (HCC)-following with oncologist [facility]  Recommendations for Preventive Services Due: see orders and patient instructions/AVS.  Recommended screening schedule for the next 5-10 years is provided to the patient in written form: see Patient Instructions/AVS.     No follow-ups on file. Subjective     Review of Systems   Constitutional:  Negative for fatigue, fever and unexpected weight change. HENT: Negative. Eyes: Negative. Respiratory:  Negative for cough and shortness of breath. Cardiovascular:  Negative for chest pain and leg swelling. Gastrointestinal:  Negative for abdominal pain, blood in stool, constipation and diarrhea. Endocrine: Negative. Genitourinary:  Negative for frequency and urgency. Musculoskeletal: Negative. Skin: Negative. Allergic/Immunologic: Negative. Neurological:  Negative for dizziness and headaches. Hematological: Negative. Psychiatric/Behavioral:  Negative for sleep disturbance. The patient is not nervous/anxious. Patient's complete Health Risk Assessment and screening values have been reviewed and are found in Flowsheets.  The following

## 2023-10-17 NOTE — PATIENT INSTRUCTIONS
[place] on Aging online. You need 0593-5187 mg of calcium and 7301-1980 IU of vitamin D per day. It is possible to meet your calcium requirement with diet alone, but a vitamin D supplement is usually necessary to meet this goal.  When exposed to the sun, use a sunscreen that protects against both UVA and UVB radiation with an SPF of 30 or greater. Reapply every 2 to 3 hours or after sweating, drying off with a towel, or swimming. Always wear a seat belt when traveling in a car. Always wear a helmet when riding a bicycle or motorcycle.

## 2023-11-13 ENCOUNTER — HOSPITAL ENCOUNTER (OUTPATIENT)
Dept: MAMMOGRAPHY | Facility: CLINIC | Age: 73
Discharge: HOME | End: 2023-11-15
Payer: MEDICARE

## 2023-11-13 DIAGNOSIS — Z78.0 MENOPAUSE: ICD-10-CM

## 2023-11-13 PROCEDURE — 77080 DXA BONE DENSITY AXIAL: CPT

## 2024-10-18 DIAGNOSIS — I10 ESSENTIAL HYPERTENSION: ICD-10-CM

## 2024-10-18 RX ORDER — ATENOLOL 25 MG/1
25 TABLET ORAL 2 TIMES DAILY
Qty: 180 TABLET | Refills: 0 | Status: SHIPPED | OUTPATIENT
Start: 2024-10-18

## 2024-10-18 RX ORDER — HYDROCHLOROTHIAZIDE 25 MG/1
25 TABLET ORAL DAILY
Qty: 90 TABLET | Refills: 0 | Status: SHIPPED | OUTPATIENT
Start: 2024-10-18

## 2024-12-31 ENCOUNTER — TELEPHONE (OUTPATIENT)
Dept: FAMILY MEDICINE CLINIC | Age: 74
End: 2024-12-31

## 2024-12-31 ENCOUNTER — OFFICE VISIT (OUTPATIENT)
Dept: FAMILY MEDICINE CLINIC | Age: 74
End: 2024-12-31

## 2024-12-31 VITALS
WEIGHT: 131 LBS | SYSTOLIC BLOOD PRESSURE: 122 MMHG | HEIGHT: 62 IN | BODY MASS INDEX: 24.11 KG/M2 | HEART RATE: 68 BPM | DIASTOLIC BLOOD PRESSURE: 66 MMHG

## 2024-12-31 DIAGNOSIS — Z01.818 PRE-OP EVALUATION: ICD-10-CM

## 2024-12-31 DIAGNOSIS — Z00.00 MEDICARE ANNUAL WELLNESS VISIT, SUBSEQUENT: Primary | ICD-10-CM

## 2024-12-31 DIAGNOSIS — M16.11 PRIMARY OSTEOARTHRITIS OF RIGHT HIP: ICD-10-CM

## 2024-12-31 DIAGNOSIS — I10 ESSENTIAL HYPERTENSION: ICD-10-CM

## 2024-12-31 DIAGNOSIS — I25.10 CORONARY ARTERY DISEASE INVOLVING NATIVE CORONARY ARTERY OF NATIVE HEART WITHOUT ANGINA PECTORIS: ICD-10-CM

## 2024-12-31 DIAGNOSIS — R35.0 URINARY FREQUENCY: ICD-10-CM

## 2024-12-31 DIAGNOSIS — Z17.1 MALIGNANT NEOPLASM OF RIGHT BREAST IN FEMALE, ESTROGEN RECEPTOR NEGATIVE, UNSPECIFIED SITE OF BREAST (HCC): ICD-10-CM

## 2024-12-31 DIAGNOSIS — R39.15 URGENCY OF MICTURITION: ICD-10-CM

## 2024-12-31 DIAGNOSIS — E78.1 HYPERTRIGLYCERIDEMIA: ICD-10-CM

## 2024-12-31 DIAGNOSIS — E78.2 MIXED HYPERLIPIDEMIA: ICD-10-CM

## 2024-12-31 DIAGNOSIS — C50.911 MALIGNANT NEOPLASM OF RIGHT BREAST IN FEMALE, ESTROGEN RECEPTOR NEGATIVE, UNSPECIFIED SITE OF BREAST (HCC): ICD-10-CM

## 2024-12-31 DIAGNOSIS — E55.9 VITAMIN D DEFICIENCY: ICD-10-CM

## 2024-12-31 LAB
BILIRUBIN, POC: NORMAL
BLOOD URINE, POC: NORMAL
CLARITY, POC: CLEAR
COLOR, POC: YELLOW
GLUCOSE URINE, POC: NORMAL MG/DL
KETONES, POC: NORMAL MG/DL
LEUKOCYTE EST, POC: NORMAL
NITRITE, POC: NORMAL
PH, POC: 5.5
PROTEIN, POC: NORMAL MG/DL
SPECIFIC GRAVITY, POC: 1.02
UROBILINOGEN, POC: 0.2 MG/DL

## 2024-12-31 SDOH — ECONOMIC STABILITY: FOOD INSECURITY: WITHIN THE PAST 12 MONTHS, THE FOOD YOU BOUGHT JUST DIDN'T LAST AND YOU DIDN'T HAVE MONEY TO GET MORE.: NEVER TRUE

## 2024-12-31 SDOH — ECONOMIC STABILITY: INCOME INSECURITY: HOW HARD IS IT FOR YOU TO PAY FOR THE VERY BASICS LIKE FOOD, HOUSING, MEDICAL CARE, AND HEATING?: NOT HARD AT ALL

## 2024-12-31 SDOH — ECONOMIC STABILITY: FOOD INSECURITY: WITHIN THE PAST 12 MONTHS, YOU WORRIED THAT YOUR FOOD WOULD RUN OUT BEFORE YOU GOT MONEY TO BUY MORE.: NEVER TRUE

## 2024-12-31 ASSESSMENT — PATIENT HEALTH QUESTIONNAIRE - PHQ9
SUM OF ALL RESPONSES TO PHQ QUESTIONS 1-9: 0
SUM OF ALL RESPONSES TO PHQ9 QUESTIONS 1 & 2: 0
SUM OF ALL RESPONSES TO PHQ QUESTIONS 1-9: 0
1. LITTLE INTEREST OR PLEASURE IN DOING THINGS: NOT AT ALL
2. FEELING DOWN, DEPRESSED OR HOPELESS: NOT AT ALL

## 2024-12-31 NOTE — PATIENT INSTRUCTIONS
attack. These may include:    Chest pain or pressure, or a strange feeling in the chest.     Sweating.     Shortness of breath.     Pain, pressure, or a strange feeling in the back, neck, jaw, or upper belly or in one or both shoulders or arms.     Lightheadedness or sudden weakness.     A fast or irregular heartbeat.   After you call 911, the  may tell you to chew 1 adult-strength or 2 to 4 low-dose aspirin. Wait for an ambulance. Do not try to drive yourself.  Watch closely for changes in your health, and be sure to contact your doctor if you have any problems.  Where can you learn more?  Go to https://www.[employer].net/patientEd and enter F075 to learn more about \"A Healthy Heart: Care Instructions.\"  Current as of: June 24, 2023  Content Version: 14.2  © 2024 [email or web address].   Care instructions adapted under license by [employer]. If you have questions about a medical condition or this instruction, always ask your healthcare professional. [employer] disclaims any warranty or liability for your use of this information.      Personalized Preventive Plan for Latisha Renteria - 12/31/2024  Medicare offers a range of preventive health benefits. Some of the tests and screenings are paid in full while other may be subject to a deductible, co-insurance, and/or copay.    Some of these benefits include a comprehensive review of your medical history including lifestyle, illnesses that may run in your family, and various assessments and screenings as appropriate.    After reviewing your medical record and screening and assessments performed today your provider may have ordered immunizations, labs, imaging, and/or referrals for you.  A list of these orders (if applicable) as well as your Preventive Care list are included within your After Visit Summary for your review.    Other Preventive Recommendations:    A preventive eye exam performed by an eye specialist is recommended every 1-2 years to

## 2024-12-31 NOTE — TELEPHONE ENCOUNTER
[facility] oncology called because they received a referral but the diagnosis code was cut off. She states if she is needing to be seen again they need progress notes, and pathology report.     Please call [phone] ext: [phone]

## 2024-12-31 NOTE — PROGRESS NOTES
Medicare Annual Wellness Visit    Latisha Renteria is here for Medicare AWV and Pre-op Exam    Assessment & Plan   Medicare annual wellness visit, subsequent  Pre-op evaluation  Primary osteoarthritis of right hip  Malignant neoplasm of right breast in female, estrogen receptor negative, unspecified site of breast (HCC)  Assessment & Plan:   Monitored by specialist- no acute findings meriting change in the plan  Essential hypertension  Coronary artery disease involving native coronary artery of native heart without angina pectoris  Mixed hyperlipidemia  Hypertriglyceridemia  Vitamin D deficiency  -     Vitamin D 25 Hydroxy; Future  Urinary frequency  -     External Referral To Urology  -     POCT Urinalysis No Micro (Auto)  Urgency of micturition  -     External Referral To Urology  -     POCT Urinalysis No Micro (Auto)    Recommendations for Preventive Services Due: see orders and patient instructions/AVS.  Recommended screening schedule for the next 5-10 years is provided to the patient in written form: see Patient Instructions/AVS.     No follow-ups on file.     Subjective       Patient's complete Health Risk Assessment and screening values have been reviewed and are found in Flowsheets. The following problems were reviewed today and where indicated follow up appointments were made and/or referrals ordered.    No Positive Risk Factors identified today.                    Inactivity:  On average, how many days per week do you engage in moderate to strenuous exercise (like a brisk walk)?: 0 days (!) Abnormal  On average, how many minutes do you engage in exercise at this level?: 0 min                   Objective   Vitals:    12/31/24 0940   BP: 122/66   Pulse: 68   Weight: 59.4 kg (131 lb)   Height: 1.575 m (5' 2\")      Body mass index is 23.96 kg/m².                  Allergies   Allergen Reactions    Erythromycin     Sulfa Antibiotics Rash     Prior to Visit Medications    Medication Sig Taking? Authorizing

## 2025-01-20 DIAGNOSIS — I10 ESSENTIAL HYPERTENSION: ICD-10-CM

## 2025-01-20 RX ORDER — ATENOLOL 25 MG/1
25 TABLET ORAL 2 TIMES DAILY
Qty: 180 TABLET | Refills: 3 | Status: SHIPPED | OUTPATIENT
Start: 2025-01-20

## 2025-01-20 RX ORDER — HYDROCHLOROTHIAZIDE 25 MG/1
25 TABLET ORAL DAILY
Qty: 90 TABLET | Refills: 3 | Status: SHIPPED | OUTPATIENT
Start: 2025-01-20

## (undated) DEVICE — DRAPE,REIN 53X77,STERILE: Brand: MEDLINE

## (undated) DEVICE — SUTURE MCRYL SZ 3-0 L27IN ABSRB UD L26MM SH 1/2 CIR Y416H

## (undated) DEVICE — NEEDLE HYPO 25GA L1.5IN BLU POLYPR HUB S STL REG BVL STR

## (undated) DEVICE — E-Z CLEAN, NON-STICK, PTFE COATED, ELECTROSURGICAL BLADE ELECTRODE, MODIFIED EXTENDED INSULATION, 2.5 INCH (6.35 CM): Brand: MEGADYNE

## (undated) DEVICE — SKIN PREP TRAY W/CHG: Brand: MEDLINE INDUSTRIES, INC.

## (undated) DEVICE — GARMENT,MEDLINE,DVT,INT,CALF,MED, GEN2: Brand: MEDLINE

## (undated) DEVICE — SUTURE PERMAHAND SZ 3-0 L30IN NONABSORBABLE BLK SH L26MM K832H

## (undated) DEVICE — DRESSING TRNSPAR W5XL4.5IN FLM SHT SEMIPERMEABLE WIND

## (undated) DEVICE — PLASMABLADE PS210-030S 3.0S LOCK: Brand: PLASMABLADE™

## (undated) DEVICE — GOWN,AURORA,NONREINFORCED,LARGE: Brand: MEDLINE

## (undated) DEVICE — DRAPE IRRIG FLD WRM W44XL44IN W/ AORN STD PRTBL INTRATEMP

## (undated) DEVICE — YANKAUER,FLEXIBLE HANDLE,REGLR CAPACITY: Brand: MEDLINE INDUSTRIES, INC.

## (undated) DEVICE — PROVE COVER: Brand: UNBRANDED

## (undated) DEVICE — TOWEL,OR,DSP,ST,BLUE,DLX,XR,4/PK,20PK/CS: Brand: MEDLINE

## (undated) DEVICE — SURGICAL PROCEDURE KIT BASIN MAJ SET UP

## (undated) DEVICE — PAD,ABDOMINAL,5"X9",ST,LF,25/BX: Brand: MEDLINE INDUSTRIES, INC.

## (undated) DEVICE — MACROBORE EXTN SET W/ 1 SMRTSITE NEEDLE-FREE VLV PRT

## (undated) DEVICE — NEEDLE SPINAL 22GA L3.5IN SPINOCAN

## (undated) DEVICE — BLANKET WRM W40.2XL55.9IN IORT LO BODY + MISTRAL AIR

## (undated) DEVICE — GLOVE SURG SZ 6 CRM LTX FREE POLYISOPRENE POLYMER BEAD ANTI

## (undated) DEVICE — SUTURE MCRYL SZ 4-0 L27IN ABSRB UD L19MM PS-2 1/2 CIR PRIM Y426H

## (undated) DEVICE — TUBING, SUCTION, 1/4" X 12', STRAIGHT: Brand: MEDLINE

## (undated) DEVICE — BANDAGE COMPR W6INXL5YD WHT BGE POLY COT M E WRP WV HK AND

## (undated) DEVICE — ADHESIVE SKIN CLSR 0.7ML TOP DERMBND ADV